# Patient Record
Sex: MALE | Race: WHITE | NOT HISPANIC OR LATINO | Employment: UNEMPLOYED | URBAN - METROPOLITAN AREA
[De-identification: names, ages, dates, MRNs, and addresses within clinical notes are randomized per-mention and may not be internally consistent; named-entity substitution may affect disease eponyms.]

---
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
## 2023-01-03 RX ORDER — HYDROXYZINE HYDROCHLORIDE 25 MG/1
25 TABLET, FILM COATED ORAL NIGHTLY PRN
Qty: 30 TABLET | Refills: 3 | Status: SHIPPED | OUTPATIENT
Start: 2023-01-03 | End: 2023-04-29 | Stop reason: SDUPTHER

## 2023-02-01 ENCOUNTER — OFFICE VISIT (OUTPATIENT)
Dept: PEDIATRICS | Facility: CLINIC | Age: 18
End: 2023-02-01
Payer: MEDICAID

## 2023-02-01 ENCOUNTER — LAB VISIT (OUTPATIENT)
Dept: LAB | Facility: HOSPITAL | Age: 18
End: 2023-02-01
Attending: PEDIATRICS
Payer: MEDICAID

## 2023-02-01 VITALS
HEART RATE: 72 BPM | TEMPERATURE: 98 F | WEIGHT: 315 LBS | SYSTOLIC BLOOD PRESSURE: 113 MMHG | BODY MASS INDEX: 45.1 KG/M2 | DIASTOLIC BLOOD PRESSURE: 73 MMHG | HEIGHT: 70 IN | RESPIRATION RATE: 20 BRPM

## 2023-02-01 DIAGNOSIS — Z00.00 ANNUAL PHYSICAL EXAM: ICD-10-CM

## 2023-02-01 DIAGNOSIS — E66.01 MORBID OBESITY: ICD-10-CM

## 2023-02-01 DIAGNOSIS — Z00.00 ANNUAL PHYSICAL EXAM: Primary | ICD-10-CM

## 2023-02-01 LAB
ALBUMIN SERPL BCP-MCNC: 3.8 G/DL (ref 3.5–5.2)
ALP SERPL-CCNC: 70 U/L (ref 55–135)
ALT SERPL W/O P-5'-P-CCNC: 36 U/L (ref 10–44)
ANION GAP SERPL CALC-SCNC: 11 MMOL/L (ref 8–16)
AST SERPL-CCNC: 17 U/L (ref 10–40)
BASOPHILS # BLD AUTO: 0.06 K/UL (ref 0–0.2)
BASOPHILS NFR BLD: 0.5 % (ref 0–1.9)
BILIRUB SERPL-MCNC: 0.4 MG/DL (ref 0.1–1)
BUN SERPL-MCNC: 9 MG/DL (ref 6–20)
CALCIUM SERPL-MCNC: 9.8 MG/DL (ref 8.7–10.5)
CHLORIDE SERPL-SCNC: 106 MMOL/L (ref 95–110)
CHOLEST SERPL-MCNC: 173 MG/DL (ref 120–199)
CHOLEST/HDLC SERPL: 5.4 {RATIO} (ref 2–5)
CO2 SERPL-SCNC: 26 MMOL/L (ref 23–29)
CREAT SERPL-MCNC: 0.8 MG/DL (ref 0.5–1.4)
DIFFERENTIAL METHOD: ABNORMAL
EOSINOPHIL # BLD AUTO: 0.2 K/UL (ref 0–0.5)
EOSINOPHIL NFR BLD: 1.8 % (ref 0–8)
ERYTHROCYTE [DISTWIDTH] IN BLOOD BY AUTOMATED COUNT: 12.7 % (ref 11.5–14.5)
EST. GFR  (NO RACE VARIABLE): >60 ML/MIN/1.73 M^2
ESTIMATED AVG GLUCOSE: 105 MG/DL (ref 68–131)
GLUCOSE SERPL-MCNC: 81 MG/DL (ref 70–110)
HBA1C MFR BLD: 5.3 % (ref 4–5.6)
HCT VFR BLD AUTO: 44.5 % (ref 40–54)
HDLC SERPL-MCNC: 32 MG/DL (ref 40–75)
HDLC SERPL: 18.5 % (ref 20–50)
HGB BLD-MCNC: 14.1 G/DL (ref 14–18)
IMM GRANULOCYTES # BLD AUTO: 0.04 K/UL (ref 0–0.04)
IMM GRANULOCYTES NFR BLD AUTO: 0.4 % (ref 0–0.5)
LDLC SERPL CALC-MCNC: 127.6 MG/DL (ref 63–159)
LYMPHOCYTES # BLD AUTO: 2.6 K/UL (ref 1–4.8)
LYMPHOCYTES NFR BLD: 23.4 % (ref 18–48)
MCH RBC QN AUTO: 26.3 PG (ref 27–31)
MCHC RBC AUTO-ENTMCNC: 31.7 G/DL (ref 32–36)
MCV RBC AUTO: 83 FL (ref 82–98)
MONOCYTES # BLD AUTO: 0.9 K/UL (ref 0.3–1)
MONOCYTES NFR BLD: 8.1 % (ref 4–15)
NEUTROPHILS # BLD AUTO: 7.2 K/UL (ref 1.8–7.7)
NEUTROPHILS NFR BLD: 65.8 % (ref 38–73)
NONHDLC SERPL-MCNC: 141 MG/DL
NRBC BLD-RTO: 0 /100 WBC
PLATELET # BLD AUTO: 328 K/UL (ref 150–450)
PMV BLD AUTO: 9.3 FL (ref 9.2–12.9)
POTASSIUM SERPL-SCNC: 3.9 MMOL/L (ref 3.5–5.1)
PROT SERPL-MCNC: 7.6 G/DL (ref 6–8.4)
RBC # BLD AUTO: 5.36 M/UL (ref 4.6–6.2)
SODIUM SERPL-SCNC: 143 MMOL/L (ref 136–145)
T4 FREE SERPL-MCNC: 1.04 NG/DL (ref 0.71–1.51)
TRIGL SERPL-MCNC: 67 MG/DL (ref 30–150)
TSH SERPL DL<=0.005 MIU/L-ACNC: 1.63 UIU/ML (ref 0.4–4)
WBC # BLD AUTO: 10.94 K/UL (ref 3.9–12.7)

## 2023-02-01 PROCEDURE — 1159F MED LIST DOCD IN RCRD: CPT | Mod: CPTII,,, | Performed by: PEDIATRICS

## 2023-02-01 PROCEDURE — 80061 LIPID PANEL: CPT | Performed by: PEDIATRICS

## 2023-02-01 PROCEDURE — 84443 ASSAY THYROID STIM HORMONE: CPT | Performed by: PEDIATRICS

## 2023-02-01 PROCEDURE — 85025 COMPLETE CBC W/AUTO DIFF WBC: CPT | Mod: PO | Performed by: PEDIATRICS

## 2023-02-01 PROCEDURE — 86762 RUBELLA ANTIBODY: CPT | Performed by: PEDIATRICS

## 2023-02-01 PROCEDURE — 86480 TB TEST CELL IMMUN MEASURE: CPT | Performed by: PEDIATRICS

## 2023-02-01 PROCEDURE — 84439 ASSAY OF FREE THYROXINE: CPT | Performed by: PEDIATRICS

## 2023-02-01 PROCEDURE — 1159F PR MEDICATION LIST DOCUMENTED IN MEDICAL RECORD: ICD-10-PCS | Mod: CPTII,,, | Performed by: PEDIATRICS

## 2023-02-01 PROCEDURE — 3008F PR BODY MASS INDEX (BMI) DOCUMENTED: ICD-10-PCS | Mod: CPTII,,, | Performed by: PEDIATRICS

## 2023-02-01 PROCEDURE — 1160F PR REVIEW ALL MEDS BY PRESCRIBER/CLIN PHARMACIST DOCUMENTED: ICD-10-PCS | Mod: CPTII,,, | Performed by: PEDIATRICS

## 2023-02-01 PROCEDURE — 86765 RUBEOLA ANTIBODY: CPT | Performed by: PEDIATRICS

## 2023-02-01 PROCEDURE — 36415 COLL VENOUS BLD VENIPUNCTURE: CPT | Mod: PO | Performed by: PEDIATRICS

## 2023-02-01 PROCEDURE — 99395 PR PREVENTIVE VISIT,EST,18-39: ICD-10-PCS | Mod: S$PBB,,, | Performed by: PEDIATRICS

## 2023-02-01 PROCEDURE — 3074F PR MOST RECENT SYSTOLIC BLOOD PRESSURE < 130 MM HG: ICD-10-PCS | Mod: CPTII,,, | Performed by: PEDIATRICS

## 2023-02-01 PROCEDURE — 83036 HEMOGLOBIN GLYCOSYLATED A1C: CPT | Performed by: PEDIATRICS

## 2023-02-01 PROCEDURE — 3008F BODY MASS INDEX DOCD: CPT | Mod: CPTII,,, | Performed by: PEDIATRICS

## 2023-02-01 PROCEDURE — 86787 VARICELLA-ZOSTER ANTIBODY: CPT | Performed by: PEDIATRICS

## 2023-02-01 PROCEDURE — 80053 COMPREHEN METABOLIC PANEL: CPT | Performed by: PEDIATRICS

## 2023-02-01 PROCEDURE — 3044F PR MOST RECENT HEMOGLOBIN A1C LEVEL <7.0%: ICD-10-PCS | Mod: CPTII,,, | Performed by: PEDIATRICS

## 2023-02-01 PROCEDURE — 99999 PR PBB SHADOW E&M-EST. PATIENT-LVL III: ICD-10-PCS | Mod: PBBFAC,,, | Performed by: PEDIATRICS

## 2023-02-01 PROCEDURE — 3078F PR MOST RECENT DIASTOLIC BLOOD PRESSURE < 80 MM HG: ICD-10-PCS | Mod: CPTII,,, | Performed by: PEDIATRICS

## 2023-02-01 PROCEDURE — 99213 OFFICE O/P EST LOW 20 MIN: CPT | Mod: PBBFAC,PN | Performed by: PEDIATRICS

## 2023-02-01 PROCEDURE — 1160F RVW MEDS BY RX/DR IN RCRD: CPT | Mod: CPTII,,, | Performed by: PEDIATRICS

## 2023-02-01 PROCEDURE — 99395 PREV VISIT EST AGE 18-39: CPT | Mod: S$PBB,,, | Performed by: PEDIATRICS

## 2023-02-01 PROCEDURE — 3044F HG A1C LEVEL LT 7.0%: CPT | Mod: CPTII,,, | Performed by: PEDIATRICS

## 2023-02-01 PROCEDURE — 3078F DIAST BP <80 MM HG: CPT | Mod: CPTII,,, | Performed by: PEDIATRICS

## 2023-02-01 PROCEDURE — 99999 PR PBB SHADOW E&M-EST. PATIENT-LVL III: CPT | Mod: PBBFAC,,, | Performed by: PEDIATRICS

## 2023-02-01 PROCEDURE — 3074F SYST BP LT 130 MM HG: CPT | Mod: CPTII,,, | Performed by: PEDIATRICS

## 2023-02-01 PROCEDURE — 86735 MUMPS ANTIBODY: CPT | Performed by: PEDIATRICS

## 2023-02-02 ENCOUNTER — PATIENT MESSAGE (OUTPATIENT)
Dept: PEDIATRICS | Facility: CLINIC | Age: 18
End: 2023-02-02
Payer: MEDICAID

## 2023-02-02 LAB
GAMMA INTERFERON BACKGROUND BLD IA-ACNC: 0.02 IU/ML
M TB IFN-G CD4+ BCKGRND COR BLD-ACNC: 0.01 IU/ML
MITOGEN IGNF BCKGRD COR BLD-ACNC: 3.18 IU/ML
MUMPS IGG INTERPRETATION: POSITIVE
MUMPS IGG SCREEN: >300 AU/ML
RUBEOLA IGG ANTIBODY: 200 AU/ML
RUBEOLA INTERPRETATION: POSITIVE
RUBV IGG SER-ACNC: 30.3 IU/ML
RUBV IGG SER-IMP: REACTIVE
TB GOLD PLUS: NEGATIVE
TB2 - NIL: 0 IU/ML
VARICELLA INTERPRETATION: POSITIVE
VARICELLA ZOSTER IGG: 1264 AU/ML

## 2023-02-12 NOTE — PROGRESS NOTES
Here for well check with parent    ALLERGY:reviewed  MEDICATIONS:reviewed  IMMUNIZATIONS:reviewed no adverse reaction  PMH: reviewed  FH:reviewed  SH:lives with family    no tobacco, drugs, alcohol    not sexually active    wears seat belt  DIET:good appetite, all foods, some junk foods  ROS   GEN:sleeps OK, no fever or weight loss   SKIN:no bruising or swelling   HEENT:hears and sees well, no eye, ear pain or neck injury, pain or masses   CHEST:normal breathing, no chest pain   CV:no cyanosis, dizziness, palpitations   ABD:nl BMs; no vomiting,no diarrhea,no pain    :nl urination, no dysuria, blood or frequency   GYN:no genital problems   MS:nl movements and gait, no swelling or pain   NEURO:no headache, weakness, incoordination, concussion signs or symptoms or spells   PSYCH:no behavior problem, depression, anxiety  PHYSICAL: see vitals reviewed   growth chart reviewed    GEN: alert, active, cooperative.Pain 0/10    SKIN:no rash, pallor, bruising or edema   HEAD:NCAT   EYE:EOMI, PERRLA, clear conjunctiva   EAR:clear canals, nl pinnae and TMs   NOSE:patent, no d/c, midline septum   MOUTH:nl teeth and gums, clear pharynx   NECK:nl ROM, no mass or thyromegaly   CHEST:nl chest wall, resp effort, clear BBS   CV:RRR, no murmur, nl S1S2   ABD:nl BS, ND, soft, NT; no HSM, mass    :nl anatomy, no mass or hernia    MS:nl ROM, no deformity or instability, nl gait, no scoliosis, no CCE   NEURO:nl tone and strength  IMP: Annual exam   PLAN:normal growth  Normal development  IUTD  GUIDANCE:teen issues and safety discussed in detail  Discussed good diet and exercise and tips for maintaining proper body weight for height  Interpretive conference conducted   Follow up annually & prn

## 2023-02-22 ENCOUNTER — OFFICE VISIT (OUTPATIENT)
Dept: OTOLARYNGOLOGY | Facility: CLINIC | Age: 18
End: 2023-02-22
Payer: MEDICAID

## 2023-02-22 ENCOUNTER — LAB VISIT (OUTPATIENT)
Dept: LAB | Facility: HOSPITAL | Age: 18
End: 2023-02-22
Attending: OTOLARYNGOLOGY
Payer: MEDICAID

## 2023-02-22 VITALS — BODY MASS INDEX: 49.09 KG/M2 | WEIGHT: 315 LBS

## 2023-02-22 DIAGNOSIS — J30.89 NON-SEASONAL ALLERGIC RHINITIS, UNSPECIFIED TRIGGER: ICD-10-CM

## 2023-02-22 DIAGNOSIS — J34.2 NASAL SEPTAL DEVIATION: ICD-10-CM

## 2023-02-22 DIAGNOSIS — J34.3 NASAL TURBINATE HYPERTROPHY: ICD-10-CM

## 2023-02-22 DIAGNOSIS — J32.9 CHRONIC SINUSITIS, UNSPECIFIED LOCATION: Primary | ICD-10-CM

## 2023-02-22 DIAGNOSIS — R09.81 CHRONIC NASAL CONGESTION: ICD-10-CM

## 2023-02-22 PROCEDURE — 3008F BODY MASS INDEX DOCD: CPT | Mod: CPTII,,, | Performed by: OTOLARYNGOLOGY

## 2023-02-22 PROCEDURE — 99999 PR PBB SHADOW E&M-EST. PATIENT-LVL II: ICD-10-PCS | Mod: PBBFAC,,, | Performed by: OTOLARYNGOLOGY

## 2023-02-22 PROCEDURE — 3044F HG A1C LEVEL LT 7.0%: CPT | Mod: CPTII,,, | Performed by: OTOLARYNGOLOGY

## 2023-02-22 PROCEDURE — 36415 COLL VENOUS BLD VENIPUNCTURE: CPT | Mod: PO | Performed by: OTOLARYNGOLOGY

## 2023-02-22 PROCEDURE — 99212 OFFICE O/P EST SF 10 MIN: CPT | Mod: PBBFAC,PO,25 | Performed by: OTOLARYNGOLOGY

## 2023-02-22 PROCEDURE — 86003 ALLG SPEC IGE CRUDE XTRC EA: CPT | Mod: 59 | Performed by: OTOLARYNGOLOGY

## 2023-02-22 PROCEDURE — 31231 NASAL ENDOSCOPY DX: CPT | Mod: S$PBB,,, | Performed by: OTOLARYNGOLOGY

## 2023-02-22 PROCEDURE — 99999 PR PBB SHADOW E&M-EST. PATIENT-LVL II: CPT | Mod: PBBFAC,,, | Performed by: OTOLARYNGOLOGY

## 2023-02-22 PROCEDURE — 1159F PR MEDICATION LIST DOCUMENTED IN MEDICAL RECORD: ICD-10-PCS | Mod: CPTII,,, | Performed by: OTOLARYNGOLOGY

## 2023-02-22 PROCEDURE — 86003 ALLG SPEC IGE CRUDE XTRC EA: CPT | Performed by: OTOLARYNGOLOGY

## 2023-02-22 PROCEDURE — 99214 PR OFFICE/OUTPT VISIT, EST, LEVL IV, 30-39 MIN: ICD-10-PCS | Mod: S$PBB,25,, | Performed by: OTOLARYNGOLOGY

## 2023-02-22 PROCEDURE — 3008F PR BODY MASS INDEX (BMI) DOCUMENTED: ICD-10-PCS | Mod: CPTII,,, | Performed by: OTOLARYNGOLOGY

## 2023-02-22 PROCEDURE — 31231 NASAL ENDOSCOPY DX: CPT | Mod: PBBFAC,PO | Performed by: OTOLARYNGOLOGY

## 2023-02-22 PROCEDURE — 99214 OFFICE O/P EST MOD 30 MIN: CPT | Mod: S$PBB,25,, | Performed by: OTOLARYNGOLOGY

## 2023-02-22 PROCEDURE — 31231 PR NASAL ENDOSCOPY, DX: ICD-10-PCS | Mod: S$PBB,,, | Performed by: OTOLARYNGOLOGY

## 2023-02-22 PROCEDURE — 3044F PR MOST RECENT HEMOGLOBIN A1C LEVEL <7.0%: ICD-10-PCS | Mod: CPTII,,, | Performed by: OTOLARYNGOLOGY

## 2023-02-22 PROCEDURE — 1159F MED LIST DOCD IN RCRD: CPT | Mod: CPTII,,, | Performed by: OTOLARYNGOLOGY

## 2023-02-22 NOTE — PROGRESS NOTES
Pediatric Otolaryngology- Head & Neck Surgery   Established Patient Visit      Chief Complaint: Follow up chronic nasal obstruction/post nasal drip    HPI  Nico Mcpherson is a 17 y.o. old male here for follow up  for chronic nasal obstruction and post nasal drip, which has been present for approximately years. Seen by me almost a year ago month ago. Was started on astelin and rinses. Helpful for some time but now with chronic symptoms over last 6 months.   The patient complains of  nasal obstruction, mucoid nasal discharge, face pain, nost nasal drip, eye pain. Dental pain, and bad breath and strange smells..  he has   been on medications for the nasal symptoms including flonase and astelin and rinses.      he has   history of allergies, has not had previous allergy testing.  Allergies do   run in the family.      The patient has has an adenoidectomy in the past.         Medical History  Past Medical History:   Diagnosis Date    BMI (body mass index), pediatric, greater than or equal to 95% for age 8/12/2014       Surgical History  Past Surgical History:   Procedure Laterality Date    ADENOIDECTOMY      REMOVAL OF NAIL OF DIGIT Left 9/20/2018    Procedure: REMOVAL, NAIL, DIGIT;  Surgeon: Blayne العراقي DPM;  Location: [facility];  Service: Podiatry;  Laterality: Left;    toenail surgery Bilateral     removal x2    TONSILLECTOMY         Medications  Current Outpatient Medications on File Prior to Visit   Medication Sig Dispense Refill    azelastine (ASTELIN) 137 mcg (0.1 %) nasal spray Spray 2 sprays (274 mcg total) by Nasal route 2 (two) times daily. 30 mL 3    fexofenadine (ALLEGRA) 180 MG tablet Take 1 tablet (180 mg total) by mouth once daily. (Patient not taking: Reported on 2/1/2023) 30 tablet 2    fluticasone propionate (FLONASE ALLERGY RELIEF) 50 mcg/actuation nasal spray 2 sprays (100 mcg total) by Each Nostril route once daily. (Patient not taking: Reported on 4/6/2022) 16 g 3    hydrOXYzine HCL (ATARAX) 25 MG  tablet Take 1 tablet (25 mg total) by mouth nightly as needed. (Patient not taking: Reported on 2/1/2023) 30 tablet 3    naproxen (NAPROSYN) 500 MG tablet Take 1 tablet (500 mg total) by mouth 2 (two) times daily as needed (headache). (Patient not taking: Reported on 2/1/2023) 20 tablet 0    omega-3 fatty acids/fish oil (FISH OIL-OMEGA-3 FATTY ACIDS) 300-1,000 mg capsule Take by mouth once daily.       No current facility-administered medications on file prior to visit.       Allergies  Review of patient's allergies indicates:   Allergen Reactions    Whey Swelling     Tongue swelling; denies trouble breathing       Social History  There are no smokers in the home    Family History  There is no family history of bleeding disorders or problems with anesthesia.           Physical Exam  General:  Alert, well developed, comfortable, mouth breathing  Voice:  Regular for age, good volume  Respiratory:  Symmetric breathing, no stridor, no distress  Head:  Normocephalic, no lesions  Face: Symmetric, HB 1/6 bilat, no lesions, no obvious sinus tenderness, salivary glands nontender  Eyes:  Sclera white, extraocular movements intact  Nose: Dorsum straight, septum w left deviation,nomral turbinate size,red mucosa  Right Ear: Pinna and external ear appears normal, EAC patent, TM intact, mobile, without middle ear effusion  Left Ear: Pinna and external ear appears normal, EAC patent, TM intact, mobile, without middle ear effusion  Hearing:  Grossly intact  Oral cavity: Healthy mucosa, no masses or lesions including lips, teeth, gums, floor of mouth, palate, or tongue.  Oropharynx: Tonsils absent, palate intact, normal pharyngeal wall movement  Neck: Supple, no palpable nodes, no masses, trachea midline, no thyroid masses  Cardiovascular system:  Pulses regular in both upper extremities, good skin turgor   Neuro: CN II-XII intact, moves all extremities spontaneously  Skin: no rash    Procedure:  Flexible fiberoptic  nasopharyngoscopy  Surgeon:  Rodger Chin MD     Detail:  After confirming patient and verbal consent, the nose was anesthetized with topical lidocaine and afrin.  The flexible fiberoptic endoscope was passed through the left nostril revealing enlarged and inflamed turbinates. There was mucoid discharge in the nasal cavity. The sope was then advanced to the nasopharynx revealing nonobstructive adenoid tissue.  The flexible fiberoptic endoscope was passed through the right nostril revealing revealing enlarged and inflamed turbinates. There was mucoid discharge in the nasal cavity.  The scope was then removed and the patient tolerated the procedure well.        Impression  1. Chronic sinusitis, unspecified location        2. Non-seasonal allergic rhinitis, unspecified trigger  CT Medtronic Sinuses without    Allergen, Pecan Tree IgE    Allergen-Alternaria Alternata    Aspergillus fumagatus IgE    Bahia grass IgE    Bermuda grass IgE    Cat epithelium IgE    Cladosporium IgE    Cockroach, American IgE    Curvularia lunata IgE    D. farinae IgE    Ceasar IgE    Ragweed, short, common IgE    Plantain, English IgE    Oak, white IgE    Marsh elder, rough IgE    Scottie grass IgE    Dust, house, David IgE    Dust, house, Valles IgE    Dog dander IgE    D. pteronyssinus IgE      3. Nasal turbinate hypertrophy        4. Chronic nasal congestion        5. Nasal septal deviation            Suspect chronic sinusitis. Worst symptoms are chronic post nasal drip and facial pressure and pain  Chronic nasal obstruction secondary to likely allergic rhinitis, turb hypertrophy and left septal deviation, symptoms greatly improved with astelin    Treatment Plan   - cont rinses  - cont astelin and oral antihistamine  - sinus ct  - poss FESS, septo/smrt. If scan normal then AI referral  Allergy testing today  Rodger Chin MD  Pediatric Otolaryngology Attending

## 2023-02-27 LAB
A ALTERNATA IGE QN: <0.1 KU/L
A FUMIGATUS IGE QN: <0.1 KU/L
BAHIA GRASS IGE QN: <0.1 KU/L
BERMUDA GRASS IGE QN: <0.1 KU/L
C HERBARUM IGE QN: <0.1 KU/L
C LUNATA IGE QN: <0.1 KU/L
CAT DANDER IGE QN: <0.1 KU/L
COMMON RAGWEED IGE QN: <0.1 KU/L
D FARINAE IGE QN: 0.13 KU/L
D PTERONYSS IGE QN: 0.16 KU/L
DEPRECATED A ALTERNATA IGE RAST QL: NORMAL
DEPRECATED A FUMIGATUS IGE RAST QL: NORMAL
DEPRECATED BAHIA GRASS IGE RAST QL: NORMAL
DEPRECATED BERMUDA GRASS IGE RAST QL: NORMAL
DEPRECATED C HERBARUM IGE RAST QL: NORMAL
DEPRECATED C LUNATA IGE RAST QL: NORMAL
DEPRECATED CAT DANDER IGE RAST QL: NORMAL
DEPRECATED COMMON RAGWEED IGE RAST QL: NORMAL
DEPRECATED D FARINAE IGE RAST QL: ABNORMAL
DEPRECATED D PTERONYSS IGE RAST QL: ABNORMAL
DEPRECATED DOG DANDER IGE RAST QL: NORMAL
DEPRECATED ELDER IGE RAST QL: NORMAL
DEPRECATED ENGL PLANTAIN IGE RAST QL: NORMAL
DEPRECATED HOUSE DUST GREER IGE RAST QL: NORMAL
DEPRECATED HOUSE DUST HS IGE RAST QL: NORMAL
DEPRECATED JOHNSON GRASS IGE RAST QL: NORMAL
DEPRECATED PECAN/HICK TREE IGE RAST QL: NORMAL
DEPRECATED ROACH IGE RAST QL: ABNORMAL
DEPRECATED TIMOTHY IGE RAST QL: NORMAL
DEPRECATED WHITE OAK IGE RAST QL: NORMAL
DOG DANDER IGE QN: <0.1 KU/L
ELDER IGE QN: <0.1 KU/L
ENGL PLANTAIN IGE QN: <0.1 KU/L
HOUSE DUST GREER IGE QN: <0.1 KU/L
HOUSE DUST HS IGE QN: <0.1 KU/L
JOHNSON GRASS IGE QN: <0.1 KU/L
PECAN/HICK TREE IGE QN: <0.1 KU/L
ROACH IGE QN: 0.11 KU/L
TIMOTHY IGE QN: <0.1 KU/L
WHITE OAK IGE QN: <0.1 KU/L

## 2023-02-28 ENCOUNTER — TELEPHONE (OUTPATIENT)
Dept: OTOLARYNGOLOGY | Facility: CLINIC | Age: 18
End: 2023-02-28
Payer: MEDICAID

## 2023-02-28 NOTE — TELEPHONE ENCOUNTER
----- Message from Rodger Chin MD sent at 2/28/2023  7:19 AM CST -----  Can u let patient know mild dust mite and cockroach allergy

## 2023-02-28 NOTE — TELEPHONE ENCOUNTER
Spoke with dad and informed him per Dr. Chin that his lab results showed a mild dust mite and cockroach allergy. Dad expressed understanding.

## 2023-03-02 RX ORDER — AZELASTINE 1 MG/ML
2 SPRAY, METERED NASAL 2 TIMES DAILY
Qty: 30 ML | Refills: 3 | Status: ON HOLD | OUTPATIENT
Start: 2023-03-02 | End: 2023-04-18 | Stop reason: HOSPADM

## 2023-03-10 ENCOUNTER — PATIENT MESSAGE (OUTPATIENT)
Dept: OTOLARYNGOLOGY | Facility: CLINIC | Age: 18
End: 2023-03-10
Payer: MEDICAID

## 2023-03-14 ENCOUNTER — TELEPHONE (OUTPATIENT)
Dept: OTOLARYNGOLOGY | Facility: CLINIC | Age: 18
End: 2023-03-14
Payer: MEDICAID

## 2023-03-14 DIAGNOSIS — R09.81 CHRONIC NASAL CONGESTION: ICD-10-CM

## 2023-03-14 DIAGNOSIS — J32.9 CHRONIC RECURRENT SINUSITIS: ICD-10-CM

## 2023-03-14 DIAGNOSIS — J30.89 NON-SEASONAL ALLERGIC RHINITIS, UNSPECIFIED TRIGGER: Primary | ICD-10-CM

## 2023-03-14 DIAGNOSIS — J34.3 NASAL TURBINATE HYPERTROPHY: ICD-10-CM

## 2023-03-14 DIAGNOSIS — J34.2 NASAL SEPTAL DEVIATION: ICD-10-CM

## 2023-03-14 DIAGNOSIS — J32.9 CHRONIC SINUSITIS, UNSPECIFIED LOCATION: ICD-10-CM

## 2023-04-17 ENCOUNTER — TELEPHONE (OUTPATIENT)
Dept: OTOLARYNGOLOGY | Facility: CLINIC | Age: 18
End: 2023-04-17
Payer: MEDICAID

## 2023-04-17 ENCOUNTER — PATIENT MESSAGE (OUTPATIENT)
Dept: OTOLARYNGOLOGY | Facility: CLINIC | Age: 18
End: 2023-04-17
Payer: MEDICAID

## 2023-04-18 ENCOUNTER — HOSPITAL ENCOUNTER (OUTPATIENT)
Facility: HOSPITAL | Age: 18
Discharge: HOME | End: 2023-04-18
Attending: OTOLARYNGOLOGY | Admitting: OTOLARYNGOLOGY
Payer: MEDICAID

## 2023-04-18 ENCOUNTER — ANESTHESIA EVENT (OUTPATIENT)
Dept: SURGERY | Facility: HOSPITAL | Age: 18
End: 2023-04-18
Payer: MEDICAID

## 2023-04-18 ENCOUNTER — ANESTHESIA (OUTPATIENT)
Dept: SURGERY | Facility: HOSPITAL | Age: 18
End: 2023-04-18
Payer: MEDICAID

## 2023-04-18 VITALS
WEIGHT: 315 LBS | DIASTOLIC BLOOD PRESSURE: 54 MMHG | SYSTOLIC BLOOD PRESSURE: 120 MMHG | BODY MASS INDEX: 45.1 KG/M2 | HEIGHT: 70 IN | TEMPERATURE: 98 F | OXYGEN SATURATION: 94 % | HEART RATE: 91 BPM | RESPIRATION RATE: 20 BRPM

## 2023-04-18 DIAGNOSIS — J34.2 NASAL SEPTAL DEVIATION: ICD-10-CM

## 2023-04-18 PROCEDURE — 37000009 HC ANESTHESIA EA ADD 15 MINS: Performed by: OTOLARYNGOLOGY

## 2023-04-18 PROCEDURE — 27201423 OPTIME MED/SURG SUP & DEVICES STERILE SUPPLY: Performed by: OTOLARYNGOLOGY

## 2023-04-18 PROCEDURE — 31267 PR NASAL/SINUS ENDOSCOPY,RMV TISS MAXILL SINUS: ICD-10-PCS | Mod: 50,51,, | Performed by: OTOLARYNGOLOGY

## 2023-04-18 PROCEDURE — 30802 PR RF ABLATION INF TURBINATE SUBMUCOSAL: ICD-10-PCS | Mod: 51,,, | Performed by: OTOLARYNGOLOGY

## 2023-04-18 PROCEDURE — 30802 ABLATE INF TURBINATE SUBMUC: CPT | Mod: 51,,, | Performed by: OTOLARYNGOLOGY

## 2023-04-18 PROCEDURE — D9220A PRA ANESTHESIA: ICD-10-PCS | Mod: ANES,,, | Performed by: ANESTHESIOLOGY

## 2023-04-18 PROCEDURE — 63600175 PHARM REV CODE 636 W HCPCS: Performed by: NURSE ANESTHETIST, CERTIFIED REGISTERED

## 2023-04-18 PROCEDURE — 31255 NSL/SINS NDSC W/TOT ETHMDCT: CPT | Mod: 59,RT,, | Performed by: OTOLARYNGOLOGY

## 2023-04-18 PROCEDURE — D9220A PRA ANESTHESIA: Mod: CRNA,,, | Performed by: NURSE ANESTHETIST, CERTIFIED REGISTERED

## 2023-04-18 PROCEDURE — 30520 REPAIR OF NASAL SEPTUM: CPT | Mod: 51,,, | Performed by: OTOLARYNGOLOGY

## 2023-04-18 PROCEDURE — 31253 PR ENDOSCOPY, NASAL/SINUS, W/ETHMOIDECTOMY: ICD-10-PCS | Mod: LT,,, | Performed by: OTOLARYNGOLOGY

## 2023-04-18 PROCEDURE — 61782 PR STEREOTACTIC COMP ASSIST PROC,CRANIAL,EXTRADURAL: ICD-10-PCS | Mod: ,,, | Performed by: OTOLARYNGOLOGY

## 2023-04-18 PROCEDURE — D9220A PRA ANESTHESIA: Mod: ANES,,, | Performed by: ANESTHESIOLOGY

## 2023-04-18 PROCEDURE — D9220A PRA ANESTHESIA: ICD-10-PCS | Mod: CRNA,,, | Performed by: NURSE ANESTHETIST, CERTIFIED REGISTERED

## 2023-04-18 PROCEDURE — 25000003 PHARM REV CODE 250: Performed by: NURSE ANESTHETIST, CERTIFIED REGISTERED

## 2023-04-18 PROCEDURE — 61782 SCAN PROC CRANIAL EXTRA: CPT | Mod: ,,, | Performed by: OTOLARYNGOLOGY

## 2023-04-18 PROCEDURE — C1763 CONN TISS, NON-HUMAN: HCPCS | Performed by: OTOLARYNGOLOGY

## 2023-04-18 PROCEDURE — 31255 PR NASAL/SINUS ENDOSCOPY,REMV TOTL ETHMOID: ICD-10-PCS | Mod: 59,RT,, | Performed by: OTOLARYNGOLOGY

## 2023-04-18 PROCEDURE — 30520 PR REPAIR, NASAL SEPTUM: ICD-10-PCS | Mod: 51,,, | Performed by: OTOLARYNGOLOGY

## 2023-04-18 PROCEDURE — 25000003 PHARM REV CODE 250: Performed by: ANESTHESIOLOGY

## 2023-04-18 PROCEDURE — 71000044 HC DOSC ROUTINE RECOVERY FIRST HOUR: Performed by: OTOLARYNGOLOGY

## 2023-04-18 PROCEDURE — 36000710: Performed by: OTOLARYNGOLOGY

## 2023-04-18 PROCEDURE — 36000711: Performed by: OTOLARYNGOLOGY

## 2023-04-18 PROCEDURE — 63600175 PHARM REV CODE 636 W HCPCS: Performed by: OTOLARYNGOLOGY

## 2023-04-18 PROCEDURE — 71000015 HC POSTOP RECOV 1ST HR: Performed by: OTOLARYNGOLOGY

## 2023-04-18 PROCEDURE — 31253 NSL/SINS NDSC TOTAL: CPT | Mod: LT,,, | Performed by: OTOLARYNGOLOGY

## 2023-04-18 PROCEDURE — 31267 ENDOSCOPY MAXILLARY SINUS: CPT | Mod: 50,51,, | Performed by: OTOLARYNGOLOGY

## 2023-04-18 PROCEDURE — 25000003 PHARM REV CODE 250: Performed by: STUDENT IN AN ORGANIZED HEALTH CARE EDUCATION/TRAINING PROGRAM

## 2023-04-18 PROCEDURE — 37000008 HC ANESTHESIA 1ST 15 MINUTES: Performed by: OTOLARYNGOLOGY

## 2023-04-18 PROCEDURE — 25000003 PHARM REV CODE 250: Performed by: OTOLARYNGOLOGY

## 2023-04-18 RX ORDER — HYDROCODONE BITARTRATE AND ACETAMINOPHEN 5; 325 MG/1; MG/1
1 TABLET ORAL EVERY 4 HOURS PRN
Status: DISCONTINUED | OUTPATIENT
Start: 2023-04-18 | End: 2023-04-18 | Stop reason: HOSPADM

## 2023-04-18 RX ORDER — LIDOCAINE HYDROCHLORIDE AND EPINEPHRINE 10; 10 MG/ML; UG/ML
INJECTION, SOLUTION INFILTRATION; PERINEURAL
Status: DISCONTINUED
Start: 2023-04-18 | End: 2023-04-18 | Stop reason: HOSPADM

## 2023-04-18 RX ORDER — MUPIROCIN 20 MG/G
OINTMENT TOPICAL
Status: DISCONTINUED
Start: 2023-04-18 | End: 2023-04-18 | Stop reason: WASHOUT

## 2023-04-18 RX ORDER — ROCURONIUM BROMIDE 10 MG/ML
INJECTION, SOLUTION INTRAVENOUS
Status: DISCONTINUED | OUTPATIENT
Start: 2023-04-18 | End: 2023-04-18

## 2023-04-18 RX ORDER — BACITRACIN ZINC 500 UNIT/G
OINTMENT (GRAM) TOPICAL
Status: DISCONTINUED | OUTPATIENT
Start: 2023-04-18 | End: 2023-04-18 | Stop reason: HOSPADM

## 2023-04-18 RX ORDER — OXYMETAZOLINE HCL 0.05 %
SPRAY, NON-AEROSOL (ML) NASAL
Status: DISCONTINUED
Start: 2023-04-18 | End: 2023-04-18 | Stop reason: HOSPADM

## 2023-04-18 RX ORDER — EPINEPHRINE 1 MG/ML
INJECTION, SOLUTION, CONCENTRATE INTRAVENOUS
Status: DISCONTINUED
Start: 2023-04-18 | End: 2023-04-18 | Stop reason: HOSPADM

## 2023-04-18 RX ORDER — DEXAMETHASONE SODIUM PHOSPHATE 4 MG/ML
INJECTION, SOLUTION INTRA-ARTICULAR; INTRALESIONAL; INTRAMUSCULAR; INTRAVENOUS; SOFT TISSUE
Status: DISCONTINUED | OUTPATIENT
Start: 2023-04-18 | End: 2023-04-18

## 2023-04-18 RX ORDER — ACETAMINOPHEN 10 MG/ML
INJECTION, SOLUTION INTRAVENOUS
Status: DISCONTINUED | OUTPATIENT
Start: 2023-04-18 | End: 2023-04-18

## 2023-04-18 RX ORDER — LIDOCAINE HYDROCHLORIDE 20 MG/ML
INJECTION INTRAVENOUS
Status: DISCONTINUED | OUTPATIENT
Start: 2023-04-18 | End: 2023-04-18

## 2023-04-18 RX ORDER — LIDOCAINE HYDROCHLORIDE 10 MG/ML
1 INJECTION, SOLUTION EPIDURAL; INFILTRATION; INTRACAUDAL; PERINEURAL ONCE
Status: DISCONTINUED | OUTPATIENT
Start: 2023-04-18 | End: 2023-04-18 | Stop reason: HOSPADM

## 2023-04-18 RX ORDER — PROPOFOL 10 MG/ML
VIAL (ML) INTRAVENOUS
Status: DISCONTINUED | OUTPATIENT
Start: 2023-04-18 | End: 2023-04-18

## 2023-04-18 RX ORDER — HYDROCODONE BITARTRATE AND ACETAMINOPHEN 5; 325 MG/1; MG/1
1 TABLET ORAL EVERY 6 HOURS PRN
Qty: 28 TABLET | Refills: 0 | Status: SHIPPED | OUTPATIENT
Start: 2023-04-18 | End: 2023-04-25

## 2023-04-18 RX ORDER — SODIUM CHLORIDE 9 MG/ML
INJECTION, SOLUTION INTRAVENOUS CONTINUOUS
Status: DISCONTINUED | OUTPATIENT
Start: 2023-04-18 | End: 2023-04-18 | Stop reason: HOSPADM

## 2023-04-18 RX ORDER — MIDAZOLAM HYDROCHLORIDE 1 MG/ML
INJECTION, SOLUTION INTRAMUSCULAR; INTRAVENOUS
Status: DISCONTINUED | OUTPATIENT
Start: 2023-04-18 | End: 2023-04-18

## 2023-04-18 RX ORDER — EPINEPHRINE 1 MG/ML
INJECTION, SOLUTION, CONCENTRATE INTRAVENOUS
Status: DISCONTINUED | OUTPATIENT
Start: 2023-04-18 | End: 2023-04-18 | Stop reason: HOSPADM

## 2023-04-18 RX ORDER — HYDROMORPHONE HYDROCHLORIDE 1 MG/ML
0.2 INJECTION, SOLUTION INTRAMUSCULAR; INTRAVENOUS; SUBCUTANEOUS EVERY 5 MIN PRN
Status: DISCONTINUED | OUTPATIENT
Start: 2023-04-18 | End: 2023-04-18 | Stop reason: HOSPADM

## 2023-04-18 RX ORDER — SODIUM CHLORIDE 0.9 % (FLUSH) 0.9 %
3 SYRINGE (ML) INJECTION
Status: DISCONTINUED | OUTPATIENT
Start: 2023-04-18 | End: 2023-04-18 | Stop reason: HOSPADM

## 2023-04-18 RX ORDER — ONDANSETRON 2 MG/ML
INJECTION INTRAMUSCULAR; INTRAVENOUS
Status: DISCONTINUED | OUTPATIENT
Start: 2023-04-18 | End: 2023-04-18

## 2023-04-18 RX ORDER — DEXMEDETOMIDINE HYDROCHLORIDE 100 UG/ML
INJECTION, SOLUTION INTRAVENOUS
Status: DISCONTINUED | OUTPATIENT
Start: 2023-04-18 | End: 2023-04-18

## 2023-04-18 RX ORDER — LIDOCAINE HYDROCHLORIDE AND EPINEPHRINE 10; 10 MG/ML; UG/ML
INJECTION, SOLUTION INFILTRATION; PERINEURAL
Status: DISCONTINUED | OUTPATIENT
Start: 2023-04-18 | End: 2023-04-18 | Stop reason: HOSPADM

## 2023-04-18 RX ORDER — CEFAZOLIN SODIUM 1 G/3ML
INJECTION, POWDER, FOR SOLUTION INTRAMUSCULAR; INTRAVENOUS
Status: DISCONTINUED | OUTPATIENT
Start: 2023-04-18 | End: 2023-04-18

## 2023-04-18 RX ORDER — FENTANYL CITRATE 50 UG/ML
INJECTION, SOLUTION INTRAMUSCULAR; INTRAVENOUS
Status: DISCONTINUED | OUTPATIENT
Start: 2023-04-18 | End: 2023-04-18

## 2023-04-18 RX ORDER — BACITRACIN 500 [USP'U]/G
OINTMENT TOPICAL
Status: DISCONTINUED
Start: 2023-04-18 | End: 2023-04-18 | Stop reason: HOSPADM

## 2023-04-18 RX ORDER — PROCHLORPERAZINE EDISYLATE 5 MG/ML
5 INJECTION INTRAMUSCULAR; INTRAVENOUS EVERY 30 MIN PRN
Status: DISCONTINUED | OUTPATIENT
Start: 2023-04-18 | End: 2023-04-18 | Stop reason: HOSPADM

## 2023-04-18 RX ADMIN — DEXMEDETOMIDINE HYDROCHLORIDE 8 MCG: 100 INJECTION, SOLUTION INTRAVENOUS at 04:04

## 2023-04-18 RX ADMIN — ROCURONIUM BROMIDE 60 MG: 10 INJECTION INTRAVENOUS at 01:04

## 2023-04-18 RX ADMIN — CEFAZOLIN 3 G: 330 INJECTION, POWDER, FOR SOLUTION INTRAMUSCULAR; INTRAVENOUS at 01:04

## 2023-04-18 RX ADMIN — DEXAMETHASONE SODIUM PHOSPHATE 8 MG: 4 INJECTION, SOLUTION INTRAMUSCULAR; INTRAVENOUS at 01:04

## 2023-04-18 RX ADMIN — MIDAZOLAM HYDROCHLORIDE 2 MG: 1 INJECTION, SOLUTION INTRAMUSCULAR; INTRAVENOUS at 12:04

## 2023-04-18 RX ADMIN — FENTANYL CITRATE 50 MCG: 50 INJECTION, SOLUTION INTRAMUSCULAR; INTRAVENOUS at 01:04

## 2023-04-18 RX ADMIN — PROPOFOL 150 MG: 10 INJECTION, EMULSION INTRAVENOUS at 01:04

## 2023-04-18 RX ADMIN — ONDANSETRON 4 MG: 2 INJECTION INTRAMUSCULAR; INTRAVENOUS at 03:04

## 2023-04-18 RX ADMIN — DEXMEDETOMIDINE HYDROCHLORIDE 12 MCG: 100 INJECTION, SOLUTION INTRAVENOUS at 04:04

## 2023-04-18 RX ADMIN — PROPOFOL 100 MG: 10 INJECTION, EMULSION INTRAVENOUS at 01:04

## 2023-04-18 RX ADMIN — LIDOCAINE HYDROCHLORIDE 100 MG: 20 INJECTION INTRAVENOUS at 01:04

## 2023-04-18 RX ADMIN — ACETAMINOPHEN 1000 MG: 10 INJECTION, SOLUTION INTRAVENOUS at 01:04

## 2023-04-18 RX ADMIN — SUGAMMADEX 300 MG: 100 INJECTION, SOLUTION INTRAVENOUS at 04:04

## 2023-04-18 RX ADMIN — FENTANYL CITRATE 50 MCG: 50 INJECTION, SOLUTION INTRAMUSCULAR; INTRAVENOUS at 02:04

## 2023-04-18 RX ADMIN — HYDROCODONE BITARTRATE AND ACETAMINOPHEN 1 TABLET: 5; 325 TABLET ORAL at 05:04

## 2023-04-18 RX ADMIN — SODIUM CHLORIDE: 0.9 INJECTION, SOLUTION INTRAVENOUS at 12:04

## 2023-04-18 NOTE — PATIENT INSTRUCTIONS
"Start your sinus rinses tomorrow. Refer to the generic instructions below. Recommend purchasing one of the [employer] bottles listed below.     Pediatric Sinus Rinse Kit Instructions:    Nasal rinses or irrigation may help your sinus symptoms by washing away mucus, allergy causing particles and irritants such as pollens, dust particles, pollutants and bacteria, which may help decrease inflammation of the sinus lining. This may help to fight sinus infections and reduce allergies symptoms.     First purchase a Rinse bottle +/- mixing ingredients:    Here are some examples from the company [employer] which you can purchase over the counter at most drug stores.         Encourage your child to "Brush their nose" themselves. I find that if you can get them to do the spray then it is much more effective.     Visit: http://www.[employer]oka.com/Use-a-Neilmed-Sinus-Rinse for more detailed and easy to follow instructions on how to use the kit. It has pictures!    IF you don't want to buy the pre-made packets you can also make a similar solution at home:      Assemble ingredients:   Salt: Kosher or pickling or seth salt   Water: Use boiled or purified water. Room temperature water or slightly warmer will make it more comfortable   Baking soda: Not powder     Avoid using if your child has any of the following:   Ear infection   Recent ear surgery   Completely block nasal or ear passage   Swallowing disorder     Cleaning of rinse container:   After each use, wash out with warm soapy water and rinse thoroughly, air dry on a paper towel. Weekly clean with a solution of 2 TBS of white distilled vinegar and one-cup of water.     Some Tips:   Breathe through your mouth or hold breath while rinsing   Stop rinsing if you need to sneeze or cough   Dont talk while rinsing   Rinse at least 1-2 hours before bedtime     Instructions for Rinsin. Wash hands   2. Fill up bottle with 8 oz. room temperature or slightly warmer water   3. Add 1 " teaspoon salt and ¼ teaspoon baking soda or add Sinus Rinse packet   4. Put cap on bottle and place finger over hole and shake until mixture is dissolved   5. Stand over the sink and bend forward, and tilt head forward   6. Put the cap tightly up to the nasal passage and squeeze bottle gently using ¼ of the solution   7. When finished, blow nose with both nostrils open and repeat to use half of the solution then blow nose again   8. Repeat the same process on the other nasal passage     A You-tube video of other children doing this can be seen at :    Child doing rinse: https://www.youOpenDoorube.com/watch?v=[email or web address]  Parent administering rinse: https://www.youtube.com/watch?v=aZgueuvJIsQ

## 2023-04-18 NOTE — TRANSFER OF CARE
"Anesthesia Transfer of Care Note    Patient: Nico Mcpherson    Procedure(s) Performed: Procedure(s) (LRB):  FESS, USING COMPUTER-ASSISTED NAVIGATION (N/A)  SEPTOPLASTY, NOSE (N/A)  REDUCTION, NASAL TURBINATE (Bilateral)    Patient location: [facility]    Anesthesia Type: general    Transport from OR: Transported from OR on 6-10 L/min O2 by face mask with adequate spontaneous ventilation    Post pain: adequate analgesia    Post assessment: no apparent anesthetic complications and tolerated procedure well    Post vital signs: stable    Level of consciousness: sedated and responds to stimulation    Nausea/Vomiting: no nausea/vomiting    Complications: none    Transfer of care protocol was followed      Last vitals:   Visit Vitals  /70   Pulse 86   Temp 37.2 °C (99 °F) (Temporal)   Resp 16   Ht 5' 10" (1.778 m)   Wt (!) 154.2 kg (340 lb)   SpO2 99%   BMI 48.78 kg/m²     "

## 2023-04-18 NOTE — H&P
Pediatric Otolaryngology- Head & Neck Surgery   Established Patient Visit      Chief Complaint: Follow up chronic nasal obstruction/post nasal drip    HPI  Nico Mcpherson is a 17 y.o. old male here for follow up  for chronic nasal obstruction and post nasal drip, which has been present for approximately years. Seen by me almost a year ago month ago. Was started on astelin and rinses. Helpful for some time but now with chronic symptoms over last 6 months.   The patient complains of  nasal obstruction, mucoid nasal discharge, face pain, nost nasal drip, eye pain. Dental pain, and bad breath and strange smells..  he has   been on medications for the nasal symptoms including flonase and astelin and rinses.      he has   history of allergies, has not had previous allergy testing.  Allergies do   run in the family.      The patient has has an adenoidectomy in the past.         Medical History  Past Medical History:   Diagnosis Date    BMI (body mass index), pediatric, greater than or equal to 95% for age 8/12/2014       Surgical History  Past Surgical History:   Procedure Laterality Date    ADENOIDECTOMY      REMOVAL OF NAIL OF DIGIT Left 9/20/2018    Procedure: REMOVAL, NAIL, DIGIT;  Surgeon: Blayne العراقي DPM;  Location: [facility];  Service: Podiatry;  Laterality: Left;    toenail surgery Bilateral     removal x2    TONSILLECTOMY         Medications  No current facility-administered medications on file prior to encounter.     Current Outpatient Medications on File Prior to Encounter   Medication Sig Dispense Refill    azelastine (ASTELIN) 137 mcg (0.1 %) nasal spray Spray 2 sprays (274 mcg total) into each nostril 2 (two) times daily. 30 mL 3    hydrOXYzine HCL (ATARAX) 25 MG tablet Take 1 tablet (25 mg total) by mouth nightly as needed. (Patient taking differently: Take 25 mg by mouth nightly.) 30 tablet 3    omega-3 fatty acids/fish oil (FISH OIL-OMEGA-3 FATTY ACIDS) 300-1,000 mg capsule Take by mouth once daily.  Rarely takes         Allergies  Review of patient's allergies indicates:   Allergen Reactions    Whey Swelling     Tongue swelling; denies trouble breathing       Social History  There are no smokers in the home    Family History  There is no family history of bleeding disorders or problems with anesthesia.           Physical Exam  General:  Alert, well developed, comfortable, mouth breathing  Voice:  Regular for age, good volume  Respiratory:  Symmetric breathing, no stridor, no distress  Head:  Normocephalic, no lesions  Face: Symmetric, HB 1/6 bilat, no lesions, no obvious sinus tenderness, salivary glands nontender  Eyes:  Sclera white, extraocular movements intact  Nose: Dorsum straight, septum w left deviation,nomral turbinate size,red mucosa  Right Ear: Pinna and external ear appears normal, EAC patent, TM intact, mobile, without middle ear effusion  Left Ear: Pinna and external ear appears normal, EAC patent, TM intact, mobile, without middle ear effusion  Hearing:  Grossly intact  Oral cavity: Healthy mucosa, no masses or lesions including lips, teeth, gums, floor of mouth, palate, or tongue.  Oropharynx: Tonsils absent, palate intact, normal pharyngeal wall movement  Neck: Supple, no palpable nodes, no masses, trachea midline, no thyroid masses  Cardiovascular system:  Pulses regular in both upper extremities, good skin turgor   Neuro: CN II-XII intact, moves all extremities spontaneously  Skin: no rash    Procedure:  Flexible fiberoptic nasopharyngoscopy  Surgeon:  Rodger Chin MD     Detail:  After confirming patient and verbal consent, the nose was anesthetized with topical lidocaine and afrin.  The flexible fiberoptic endoscope was passed through the left nostril revealing enlarged and inflamed turbinates. There was mucoid discharge in the nasal cavity. The sope was then advanced to the nasopharynx revealing nonobstructive adenoid tissue.  The flexible fiberoptic endoscope was passed through the right  nostril revealing revealing enlarged and inflamed turbinates. There was mucoid discharge in the nasal cavity.  The scope was then removed and the patient tolerated the procedure well.        Impression  1. Chronic sinusitis, unspecified location        2. Non-seasonal allergic rhinitis, unspecified trigger  CT Med[employer] Sinuses without    Allergen, Pecan Tree IgE    Allergen-Alternaria Alternata    Aspergillus fumagatus IgE    Bahia grass IgE    Bermuda grass IgE    Cat epithelium IgE    Cladosporium IgE    Cockroach, American IgE    Curvularia lunata IgE    D. farinae IgE    Ceasar IgE    Ragweed, short, common IgE    Plantain, English IgE    Oak, white IgE    Marsh elder, rough IgE    Scottie grass IgE    Dust, house, [place] IgE    Dust, house, Valles IgE    Dog dander IgE    D. pteronyssinus IgE      3. Nasal turbinate hypertrophy        4. Chronic nasal congestion        5. Nasal septal deviation            chronic sinusitis.     Treatment Plan      FESS, septo/smrt. If scan normal then AI referral     Rodger Chin MD  Pediatric Otolaryngology Attending

## 2023-04-18 NOTE — ANESTHESIA PREPROCEDURE EVALUATION
04/18/2023  Nico Mcpherson is a 17 y.o., male.    Patient Active Problem List   Diagnosis    BMI (body mass index), pediatric, greater than or equal to 95% for age    ETD (Eustachian tube dysfunction), right    Tinnitus, right         HPI:  HPI  Nico Mcpherson is a 17 y.o. old male here for follow up  for chronic nasal obstruction and post nasal drip, which has been present for approximately years. Seen by me almost a year ago month ago. Was started on astelin and rinses. Helpful for some time but now with chronic symptoms over last 6 months.   The patient complains of  nasal obstruction, mucoid nasal discharge, face pain, nost nasal drip, eye pain. Dental pain, and bad breath and strange smells..  he has   been on medications for the nasal symptoms including flonase and astelin and rinses.        Pre-op Assessment    I have reviewed the Patient Summary Reports.     I have reviewed the Nursing Notes. I have reviewed the NPO Status.   I have reviewed the Medications.     Review of Systems  Anesthesia Hx:  No problems with previous Anesthesia    Social:  Non-Smoker, No Alcohol Use    Hematology/Oncology:  Hematology Normal   Oncology Normal     Cardiovascular:   Exercise tolerance: good NYHA Classification I    Pulmonary:  Pulmonary Normal    Renal/:  Renal/ Normal     Hepatic/GI:  Hepatic/GI Normal    Musculoskeletal:  Musculoskeletal Normal    Neurological:  Neurology Normal    Endocrine:  Endocrine Normal  Morbid Obesity / BMI > 40  Dermatological:  Skin Normal    Psych:  Psychiatric Normal           Physical Exam  General: Well nourished    Airway:  Mallampati: I / I  Mouth Opening: Normal  TM Distance: Normal  Tongue: Normal  Neck ROM: Normal ROM    Dental:  Intact        Anesthesia Plan  Type of Anesthesia, risks & benefits discussed:    Anesthesia Type: Gen ETT  Intra-op Monitoring Plan: Standard  ASA Monitors  Post Op Pain Control Plan: multimodal analgesia  Induction:  IV  Airway Plan: Direct, Post-Induction  Informed Consent: Informed consent signed with the Patient representative and all parties understand the risks and agree with anesthesia plan.  All questions answered. Patient consented to blood products? Yes  ASA Score: 3  Day of Surgery Review of History & Physical: H&P Update referred to the surgeon/provider.    Ready For Surgery From Anesthesia Perspective.     .

## 2023-04-19 NOTE — OP NOTE
Otolaryngology- Head & Neck Surgery  Operative Report    Patient: Nico Mcpherson   Medical Record Number: [id number]   YOB: 2005   Date of service: 4/18/23      Preoperative Diagnoses:  1. Maxillary sinusitis, bilateral  2. Ethmoid sinusitis, bilateral  3. Frontal sinusitis, left  4. Septal deviation, right  5. Nasal turbinate hypertrophy    Postoperative Diagnoses:   1. Maxillary sinusitis, bilateral  2. Ethmoid sinusitis, bilateral  3. Frontal sinusitis, left  4. Septal deviation, right  5. Nasal turbinate hypertrophy    Procedures:   Endoscopic maxillary antrostomy with tissue removal, bilateral  Endoscopic total ethmoidectomy with tissue removal, bilateral  3. Endoscopic frontal recess resection, left  4. Endoscopic septoplasty  5. Submucous resection of inferior turbinates, bilateral  6. Use of intraoperative computer aided image guidance, 2 hr     Surgeon:   Rodger Chin MD  Pediatric Otolaryngology Attending       Assistants: Ike Wheeler MD       Anesthesia:   General endotracheal     Estimated blood loss:   25 ml    Complications:   None     Findings:   Disease of maxillar , ethrmoid and left frontal sinuses  Right nasal septal deviation  Nasal turbinate hypertrophy    Indications and consent:   Nico Mcpherson is a pleasant 17 y.o. who has been seen in the Pediatric Otolaryngology department at [facility] in consultation for assistance with surgical management of his sinus disease.  The risks, benefits, and alternatives to the procedures noted above were discussed with the family and informed consent was obtained.     Operative detail:     On the day of the procedure, Nico Mcpherson was brought to the operating theater and positioned supine. Proper sign in, time out, and sign out procedures were performed. Smooth induction of general endotracheal anesthesia was performed by the Anesthesia service. Perioperative analgesia and vasoconstriction were accomplished via intranasal injection  with 1% lidocaine with 1:100,000 parts epinephrine. Afrin pledgets were also applied.     The preoperative image guidance protocol CT scan was reviewed and the correct patient information was confirmed. The scans were loaded onto the image guidance system and the system was calibrated and found to be functioning appropriately.  This was used for the remainder of the procedure to avoid injury to vital structures including the lamina papyracea, carotid artery, and other vascular and neural structures.      The patient was draped in standard fashion and nasal endoscopy was performed. Throughout the procedure a 0-degree telescope was utilized to visualize the sinus cavities. Injection of the uncinate process, the middle turbinate and the axillae of the middle turbinates with 1% lidocaine with 1:100,000 parts epinephrine was accomplished bilaterally. Afrin pledgets were placed in the middle meatus bilaterally for additional decongestion.     Next we proceeded with right maxillary antrostomy with tissue removal. The middle turbinate was gently medialized and the uncinate process was identified. The uncinate was removed using a combination of sharp and powered instrumentation and the natural maxillary ostium was identified.  The natural ostium opening was widened with a microdebrider. Sinus contents were removed.      We proceeded with right total ethmoidectomy. The ethmoid bulla was visually confirmed and confirmed with image guidance. The bulla was then resected from inferior and medial to lateral and superior, taking care to avoid injury to the lamina papyracea. The superior ethmoid cells were resected as well, revealing the ground lamella of the middle turbinate. This was entered low and medial, entering the posterior ethmoid system and the posterior ethmoid cells were exenterated taking care to avoid injury to the skull base superiorly, turbinate medially and the lamina laterally. Image guidance was used to confirm  completeness of dissection.      Attention was then turned to left maxillary antrostomy with tissue removal. The middle turbinate was gently medialized and the uncinate process was identified. The uncinate was removed using a combination of sharp and powered instrumentation and the natural maxillary ostium was identified.  The natural ostium opening was widened with a microdebrider. Sinus contents were removed.      We proceeded with left total ethmoidectomy. The ethmoid bulla was visually confirmed and confirmed with image guidance. The bulla was then resected from inferior and medial to lateral and superior, taking care to avoid injury to the lamina papyracea. The superior ethmoid cells were resected as well, revealing the ground lamella of the middle turbinate. This was entered low and medial, entering the posterior ethmoid system and the posterior ethmoid cells were exenterated taking care to avoid injury to the skull base superiorly, turbinate medially and the lamina laterally. Image guidance was used to confirm completeness of dissection.      We then proceeded with the frontal recess resection  on the left. The crotch of the middle turbinate and its anterior most connection to the sidewall taken down with a through cut. Next agger nassi cells resected. Using an angled camera the frontal recess was examined and open.The sinuses were copiously irrigated with saline.  Epinephrine pledgets were then place in the sinus cavities for hemostasis.     Pledgets were removed     Next we proceeded with the septoplasty. Endoscope introduced. An ame-transfixion incision was created on the left side of the septum. A mucoperichondrial flap was enveloped with an elevator. Next, an incision was made into the deviated cartilage with the elevator. The contralateral mucoperichondrial flap was then enveloped.  Next, the deviated cartilaginous and bony septum was incised with a scissor. The deviated septal pieces were removed. A  generous caudal cartilage strut was maintained.   T     Next, attention was then turned toward submucous resection of the inferior turbinates.   The microdebrider was inserted into the left inferior turbinate and submucous resection was carried out. After the turbinate was sufficiently reduced, attention was then turned to the right.The microdebrider was inserted into the right inferior turbinate and submucous resection was carried out. After the turbinate was sufficiently reduced the procedure was concluded.     At this point, the goals of the procedure were complete. The endoscope and instruments were removed from the patient. The care of the patient was turned over to the Anesthesia service where he was awakened, extubated, and transferred to the PACU.  Dr. Chin was present and actively participated in the entirety of the dictated procedure

## 2023-04-20 NOTE — ANESTHESIA RELEASE NOTE
"Anesthesia Release from PACU Note    Patient: Nico Mcpherson    Procedure(s) Performed: Procedure(s) (LRB):  FESS, USING COMPUTER-ASSISTED NAVIGATION (N/A)  SEPTOPLASTY, NOSE (N/A)  REDUCTION, NASAL TURBINATE (Bilateral)    Anesthesia type: general    Post pain: Adequate analgesia    Post assessment: no apparent anesthetic complications and tolerated procedure well    Last Vitals:   Visit Vitals  BP (!) 120/54   Pulse 91   Temp 36.8 °C (98.2 °F) (Temporal)   Resp 20   Ht 5' 10" (1.778 m)   Wt (!) 154.2 kg (340 lb)   SpO2 (!) 94%   BMI 48.78 kg/m²       Post vital signs: stable    Level of consciousness: awake and alert     Nausea/Vomiting: no nausea/no vomiting    Complications: none    Airway Patency: patent    Respiratory: unassisted, spontaneous ventilation, room air    Cardiovascular: stable and blood pressure at baseline    Hydration: euvolemic  "

## 2023-04-20 NOTE — ANESTHESIA POSTPROCEDURE EVALUATION
Anesthesia Post Evaluation    Patient: [facility]    Procedure(s) Performed: Procedure(s) (LRB):  FESS, USING COMPUTER-ASSISTED NAVIGATION (N/A)  SEPTOPLASTY, NOSE (N/A)  REDUCTION, NASAL TURBINATE (Bilateral)    Final Anesthesia Type: general      Patient location during evaluation: PACU  Patient participation: Yes- Able to Participate  Level of consciousness: awake and alert  Post-procedure vital signs: reviewed and stable  Pain management: adequate  Airway patency: patent    PONV status at discharge: No PONV  Anesthetic complications: no      Cardiovascular status: hemodynamically stable  Respiratory status: unassisted, spontaneous ventilation and room air  Hydration status: euvolemic  Follow-up not needed.          Vitals Value Taken Time   /54 04/18/23 1701   Temp 36.8 °C (98.2 °F) 04/18/23 1622   Pulse 90 04/18/23 1716   Resp 20 04/18/23 1715   SpO2 96 % 04/18/23 1716   Vitals shown include unvalidated device data.      No case tracking events are documented in the log.      Pain/Scott Score: No data recorded

## 2023-04-29 DIAGNOSIS — F41.9 ANXIOUSNESS: ICD-10-CM

## 2023-05-01 RX ORDER — HYDROXYZINE HYDROCHLORIDE 25 MG/1
25 TABLET, FILM COATED ORAL NIGHTLY PRN
Qty: 30 TABLET | Refills: 3 | Status: SHIPPED | OUTPATIENT
Start: 2023-05-01 | End: 2023-08-31 | Stop reason: SDUPTHER

## 2023-05-03 ENCOUNTER — OFFICE VISIT (OUTPATIENT)
Dept: OTOLARYNGOLOGY | Facility: CLINIC | Age: 18
End: 2023-05-03
Payer: MEDICAID

## 2023-05-03 VITALS — WEIGHT: 315 LBS | BODY MASS INDEX: 48.78 KG/M2

## 2023-05-03 DIAGNOSIS — J30.89 NON-SEASONAL ALLERGIC RHINITIS, UNSPECIFIED TRIGGER: Primary | ICD-10-CM

## 2023-05-03 PROCEDURE — 3008F PR BODY MASS INDEX (BMI) DOCUMENTED: ICD-10-PCS | Mod: CPTII,,, | Performed by: OTOLARYNGOLOGY

## 2023-05-03 PROCEDURE — 1159F MED LIST DOCD IN RCRD: CPT | Mod: CPTII,,, | Performed by: OTOLARYNGOLOGY

## 2023-05-03 PROCEDURE — 99212 OFFICE O/P EST SF 10 MIN: CPT | Mod: PBBFAC,PO | Performed by: OTOLARYNGOLOGY

## 2023-05-03 PROCEDURE — 1159F PR MEDICATION LIST DOCUMENTED IN MEDICAL RECORD: ICD-10-PCS | Mod: CPTII,,, | Performed by: OTOLARYNGOLOGY

## 2023-05-03 PROCEDURE — 99024 POSTOP FOLLOW-UP VISIT: CPT | Mod: ,,, | Performed by: OTOLARYNGOLOGY

## 2023-05-03 PROCEDURE — 3008F BODY MASS INDEX DOCD: CPT | Mod: CPTII,,, | Performed by: OTOLARYNGOLOGY

## 2023-05-03 PROCEDURE — 3044F HG A1C LEVEL LT 7.0%: CPT | Mod: CPTII,,, | Performed by: OTOLARYNGOLOGY

## 2023-05-03 PROCEDURE — 99024 PR POST-OP FOLLOW-UP VISIT: ICD-10-PCS | Mod: ,,, | Performed by: OTOLARYNGOLOGY

## 2023-05-03 PROCEDURE — 99999 PR PBB SHADOW E&M-EST. PATIENT-LVL II: ICD-10-PCS | Mod: PBBFAC,,, | Performed by: OTOLARYNGOLOGY

## 2023-05-03 PROCEDURE — 3044F PR MOST RECENT HEMOGLOBIN A1C LEVEL <7.0%: ICD-10-PCS | Mod: CPTII,,, | Performed by: OTOLARYNGOLOGY

## 2023-05-03 PROCEDURE — 99999 PR PBB SHADOW E&M-EST. PATIENT-LVL II: CPT | Mod: PBBFAC,,, | Performed by: OTOLARYNGOLOGY

## 2023-05-03 NOTE — PROGRESS NOTES
Pediatric Otolaryngology- Head & Neck Surgery   Established Patient Visit      Chief Complaint: Follow up  FESS, SMRT and septo    HPI  Nico Mcpherson is a 17 y.o. old male here for follow up FESS, SMRT and septo. :Breathing better. PND resolved.          he has   history of allergies, has had previous allergy testing.  Allergies do   run in the family.      The patient has has an adenoidectomy in the past.         Medical History  Past Medical History:   Diagnosis Date    BMI (body mass index), pediatric, greater than or equal to 95% for age 8/12/2014       Surgical History  Past Surgical History:   Procedure Laterality Date    ADENOIDECTOMY      FUNCTIONAL ENDOSCOPIC SINUS SURGERY (FESS) USING COMPUTER-ASSISTED NAVIGATION N/A 4/18/2023    Procedure: FESS, USING COMPUTER-ASSISTED NAVIGATION;  Surgeon: Rodger Chin MD;  Location: [facility] OR [address] [place] [address];  Service: ENT;  Laterality: N/A;    NASAL SEPTOPLASTY N/A 4/18/2023    Procedure: SEPTOPLASTY, NOSE;  Surgeon: Rodger Chin MD;  Location: [facility] OR [address] [place] [address];  Service: ENT;  Laterality: N/A;    NASAL TURBINATE REDUCTION Bilateral 4/18/2023    Procedure: REDUCTION, NASAL TURBINATE;  Surgeon: Rodger Chin MD;  Location: [facility] OR [address] [place] [address];  Service: ENT;  Laterality: Bilateral;    REMOVAL OF NAIL OF DIGIT Left 9/20/2018    Procedure: REMOVAL, NAIL, DIGIT;  Surgeon: Blayne العراقي DPM;  Location: [facility] OR;  Service: Podiatry;  Laterality: Left;    toenail surgery Bilateral     removal x2    TONSILLECTOMY         Medications  Current Outpatient Medications on File Prior to Visit   Medication Sig Dispense Refill    hydrOXYzine HCL (ATARAX) 25 MG tablet Take 1 tablet (25 mg total) by mouth nightly as needed. (Patient not taking: Reported on 5/3/2023) 30 tablet 3    omega-3 fatty acids/fish oil (FISH OIL-OMEGA-3 FATTY ACIDS) 300-1,000 mg capsule Take by mouth once daily. Rarely takes       No current facility-administered medications on file prior to visit.        Allergies  Review of patient's allergies indicates:   Allergen Reactions    Whey Swelling     Tongue swelling; denies trouble breathing       Social History  There are no smokers in the home    Family History  There is no family history of bleeding disorders or problems with anesthesia.           Physical Exam  General:  Alert, well developed, comfortable, mouth breathing  Voice:  Regular for age, good volume  Respiratory:  Symmetric breathing, no stridor, no distress  Head:  Normocephalic, no lesions  Face: Symmetric, HB 1/6 bilat, no lesions, no obvious sinus tenderness, salivary glands nontender  Eyes:  Sclera white, extraocular movements intact  Nose: Dorsum straight, septum straight,nomral turbinate size,red mucosa  Right Ear: Pinna and external ear appears normal, EAC patent, TM intact, mobile, without middle ear effusion  Left Ear: Pinna and external ear appears normal, EAC patent, TM intact, mobile, without middle ear effusion  Hearing:  Grossly intact  Oral cavity: Healthy mucosa, no masses or lesions including lips, teeth, gums, floor of mouth, palate, or tongue.  Oropharynx: Tonsils absent, palate intact, normal pharyngeal wall movement  Neck: Supple, no palpable nodes, no masses, trachea midline, no thyroid masses  Cardiovascular system:  Pulses regular in both upper extremities, good skin turgor   Neuro: CN II-XII intact, moves all extremities spontaneously  Skin: no rash    Procedure:  Nose debrided of minor amount of mucous       Impression  1. Non-seasonal allergic rhinitis, unspecified trigger            chronic sinusitis and nasal obstruction s/p FESS, SMRT and septo. Doing well  Treatment Plan   - cont rinses  - rtc prn  Rodger Chin MD  Pediatric Otolaryngology Attending

## 2023-08-31 DIAGNOSIS — F41.9 ANXIOUSNESS: ICD-10-CM

## 2023-08-31 RX ORDER — HYDROXYZINE HYDROCHLORIDE 25 MG/1
25 TABLET, FILM COATED ORAL NIGHTLY PRN
Qty: 30 TABLET | Refills: 3 | Status: CANCELLED | OUTPATIENT
Start: 2023-08-31

## 2023-09-01 DIAGNOSIS — F41.9 ANXIOUSNESS: ICD-10-CM

## 2023-09-01 RX ORDER — HYDROXYZINE HYDROCHLORIDE 25 MG/1
25 TABLET, FILM COATED ORAL NIGHTLY PRN
Qty: 30 TABLET | Refills: 3 | Status: SHIPPED | OUTPATIENT
Start: 2023-09-01 | End: 2024-01-01 | Stop reason: SDUPTHER

## 2023-10-01 ENCOUNTER — PATIENT MESSAGE (OUTPATIENT)
Dept: PEDIATRICS | Facility: CLINIC | Age: 18
End: 2023-10-01
Payer: MEDICAID

## 2023-11-13 ENCOUNTER — OFFICE VISIT (OUTPATIENT)
Dept: URGENT CARE | Facility: CLINIC | Age: 18
End: 2023-11-13
Payer: MEDICAID

## 2023-11-13 VITALS
HEART RATE: 75 BPM | WEIGHT: 315 LBS | SYSTOLIC BLOOD PRESSURE: 155 MMHG | DIASTOLIC BLOOD PRESSURE: 88 MMHG | RESPIRATION RATE: 16 BRPM | TEMPERATURE: 98 F | BODY MASS INDEX: 45.1 KG/M2 | HEIGHT: 70 IN

## 2023-11-13 DIAGNOSIS — J00 ACUTE NASOPHARYNGITIS: ICD-10-CM

## 2023-11-13 DIAGNOSIS — J02.9 SORE THROAT: ICD-10-CM

## 2023-11-13 DIAGNOSIS — H10.33 ACUTE CONJUNCTIVITIS OF BOTH EYES, UNSPECIFIED ACUTE CONJUNCTIVITIS TYPE: Primary | ICD-10-CM

## 2023-11-13 LAB
CTP QC/QA: YES
MOLECULAR STREP A: NEGATIVE

## 2023-11-13 PROCEDURE — 87651 STREP A DNA AMP PROBE: CPT | Mod: QW,S$GLB,, | Performed by: NURSE PRACTITIONER

## 2023-11-13 PROCEDURE — 99203 PR OFFICE/OUTPT VISIT, NEW, LEVL III, 30-44 MIN: ICD-10-PCS | Mod: 95,S$GLB,, | Performed by: NURSE PRACTITIONER

## 2023-11-13 PROCEDURE — 87651 POCT STREP A MOLECULAR: ICD-10-PCS | Mod: QW,S$GLB,, | Performed by: NURSE PRACTITIONER

## 2023-11-13 PROCEDURE — 99203 OFFICE O/P NEW LOW 30 MIN: CPT | Mod: 95,S$GLB,, | Performed by: NURSE PRACTITIONER

## 2023-11-13 RX ORDER — POLYMYXIN B SULFATE AND TRIMETHOPRIM 1; 10000 MG/ML; [USP'U]/ML
1 SOLUTION OPHTHALMIC EVERY 6 HOURS
Qty: 10 ML | Refills: 0 | Status: SHIPPED | OUTPATIENT
Start: 2023-11-13 | End: 2024-01-02

## 2023-11-13 NOTE — PROGRESS NOTES
"Subjective:      Patient ID: Nico Mcpherson is a 18 y.o. male.    Vitals:  height is 5' 10" (1.778 m) and weight is 147.4 kg (325 lb) (abnormal). His oral temperature is 98 °F (36.7 °C). His blood pressure is 155/88 (abnormal) and his pulse is 75. His respiration is 16.     Chief Complaint: Eye Problem    Pt states bilateral eye redness, cough and post nasal drip for 5 days. States throat hurts denies fever at home OTC meds taking nasal spray.  Body aches but no fever.  Previously worked at [employer], not working right now.  Denies known sick contacts.      Eye Problem   Both eyes are affected. This is a new problem. The current episode started in the past 7 days. The problem occurs constantly. The problem has been gradually worsening. There was no injury mechanism. The pain is at a severity of 0/10. The patient is experiencing no pain. There is No known exposure to pink eye. He Does not wear contacts. Associated symptoms include an eye discharge, eye redness and a recent URI. Pertinent negatives include no blurred vision, double vision, fever, foreign body sensation, itching, nausea, photophobia or vomiting. He has tried nothing for the symptoms. The treatment provided no relief.   URI   This is a new problem. The problem has been unchanged. There has been no fever. Associated symptoms include congestion, coughing and a sore throat. Pertinent negatives include no abdominal pain, chest pain, diarrhea, dysuria, ear pain, headaches, joint pain, joint swelling, nausea, neck pain, plugged ear sensation, rash, rhinorrhea, sinus pain, sneezing, swollen glands, vomiting or wheezing.       Constitution: Negative for fever.   HENT:  Positive for congestion and sore throat. Negative for ear pain and sinus pain.    Neck: Negative for neck pain.   Cardiovascular:  Negative for chest pain.   Eyes:  Positive for eye discharge and eye redness. Negative for eye itching, photophobia, double vision and blurred vision.   Respiratory:  " Positive for cough. Negative for wheezing.    Gastrointestinal:  Negative for abdominal pain, nausea, vomiting and diarrhea.   Genitourinary:  Negative for dysuria.   Skin:  Negative for rash.   Allergic/Immunologic: Negative for sneezing.   Neurological:  Negative for headaches.      Objective:     Physical Exam   Constitutional: He is oriented to person, place, and time. He appears well-developed.  Non-toxic appearance. He does not appear ill. No distress.   HENT:   Head: Normocephalic and atraumatic.   Ears:   Right Ear: Tympanic membrane, external ear and ear canal normal.   Left Ear: Tympanic membrane, external ear and ear canal normal.   Nose: Nose normal.   Mouth/Throat: Oropharynx is clear and moist.   Eyes: EOM and lids are normal. Pupils are equal, round, and reactive to light. No visual field deficit is present. Right eye exhibits discharge. Right eye exhibits no hordeolum. No foreign body present in the right eye. Left eye exhibits discharge. Left eye exhibits no hordeolum. No foreign body present in the left eye. Right conjunctiva is injected. Left conjunctiva is injected. Extraocular movement intact vision grossly intact   Neck: Trachea normal and phonation normal. Neck supple.   Musculoskeletal: Normal range of motion.         General: Normal range of motion.   Neurological: He is alert and oriented to person, place, and time.   Skin: Skin is warm, dry and intact.   Psychiatric: His speech is normal and behavior is normal. Judgment and thought content normal.   Nursing note and vitals reviewed.    The patient location is: [facility]  The chief complaint leading to consultation is: Eye redness    Visit type: audiovisual    Face to Face time with patient: 15 mins  20 mins minutes of total time spent on the encounter, which includes face to face time and non-face to face time preparing to see the patient (eg, review of tests), Obtaining and/or reviewing separately obtained history, Documenting clinical  information in the electronic or other health record, Independently interpreting results (not separately reported) and communicating results to the patient/family/caregiver, or Care coordination (not separately reported).         Each patient to whom he or she provides medical services by telemedicine is:  (1) informed of the relationship between the physician and patient and the respective role of any other health care provider with respect to management of the patient; and (2) notified that he or she may decline to receive medical services by telemedicine and may withdraw from such care at any time.    Notes:    Assessment:      1. Acute conjunctivitis of both eyes, unspecified acute conjunctivitis type    2. Sore throat    3. Acute nasopharyngitis        Plan:     Results for orders placed or performed in visit on 11/13/23   POCT Strep A, Molecular   Result Value Ref Range    Molecular Strep A, POC Negative Negative     Acceptable Yes      Vision Screening    Right eye Left eye Both eyes   Without correction 20/50 20/50 20/50   With correction          Does not have his glasses w/ him.  Denies visual changes.  Denies trauma.  Denies light sensitivity.  Does not wear contacts.    Acute conjunctivitis of both eyes, unspecified acute conjunctivitis type  -     polymyxin B sulf-trimethoprim (POLYTRIM) 10,000 unit- 1 mg/mL Drop; Place 1 drop into both eyes every 6 (six) hours. for 7 days  Dispense: 10 mL; Refill: 0    Sore throat  -     POCT Strep A, Molecular    Acute nasopharyngitis        Patient Instructions   URI:  Please drink plenty of fluids.  Please get plenty of rest.  Please return here or go to the Emergency Department for any concerns or worsening of condition.  If you do not have Hypertension or any history of palpitations, it is ok to take over the counter Sudafed or Mucinex D or Allegra-D or Claritin-D or Zyrtec-D.  If you do take one of the above, it is ok to combine that with plain  over the counter Mucinex or Allegra or Claritin or Zyrtec.  If for example you are taking Zyrtec -D, you can combine that with Mucinex, but not Mucinex-D.  If you are taking Mucinex-D, you can combine that with plain Allegra or Claritin or Zyrtec.   If you do have Hypertension or palpitations, it is safe to take Coricidin HBP for relief of sinus symptoms.  If not allergic, please take over the counter Tylenol (Acetaminophen) and/or Motrin (Ibuprofen) as directed for control of pain and/or fever.  Please follow up with your primary care doctor or specialist as needed.    If you  smoke, please stop smoking.      EYE:  If your condition worsens or fails to improve we recommend that you receive another evaluation at the ER immediately or contact your PCP to discuss your concerns or return here. You must understand that you've received an urgent care treatment only and that you may be released before all your medical problems are known or treated. You the patient will arrange for followup care as instructed.   If you develop increase eye symptoms or change in your vision seek medical care immediately either with your ophthalomologist or the ER or return here.

## 2023-11-13 NOTE — PATIENT INSTRUCTIONS
URI:  Please drink plenty of fluids.  Please get plenty of rest.  Please return here or go to the Emergency Department for any concerns or worsening of condition.  If you do not have Hypertension or any history of palpitations, it is ok to take over the counter Sudafed or Mucinex D or Allegra-D or Claritin-D or Zyrtec-D.  If you do take one of the above, it is ok to combine that with plain over the counter Mucinex or Allegra or Claritin or Zyrtec.  If for example you are taking Zyrtec -D, you can combine that with Mucinex, but not Mucinex-D.  If you are taking Mucinex-D, you can combine that with plain Allegra or Claritin or Zyrtec.   If you do have Hypertension or palpitations, it is safe to take Coricidin HBP for relief of sinus symptoms.  If not allergic, please take over the counter Tylenol (Acetaminophen) and/or Motrin (Ibuprofen) as directed for control of pain and/or fever.  Please follow up with your primary care doctor or specialist as needed.    If you  smoke, please stop smoking.      EYE:  If your condition worsens or fails to improve we recommend that you receive another evaluation at the ER immediately or contact your PCP to discuss your concerns or return here. You must understand that you've received an urgent care treatment only and that you may be released before all your medical problems are known or treated. You the patient will arrange for followup care as instructed.   If you develop increase eye symptoms or change in your vision seek medical care immediately either with your ophthalomologist or the ER or return here.

## 2024-01-01 DIAGNOSIS — F41.9 ANXIOUSNESS: ICD-10-CM

## 2024-01-01 DIAGNOSIS — H10.33 ACUTE CONJUNCTIVITIS OF BOTH EYES, UNSPECIFIED ACUTE CONJUNCTIVITIS TYPE: ICD-10-CM

## 2024-01-03 RX ORDER — HYDROXYZINE HYDROCHLORIDE 25 MG/1
25 TABLET, FILM COATED ORAL NIGHTLY PRN
Qty: 30 TABLET | Refills: 3 | Status: SHIPPED | OUTPATIENT
Start: 2024-01-03

## 2024-01-04 RX ORDER — POLYMYXIN B SULFATE AND TRIMETHOPRIM 1; 10000 MG/ML; [USP'U]/ML
1 SOLUTION OPHTHALMIC EVERY 6 HOURS
Qty: 10 ML | Refills: 0 | OUTPATIENT
Start: 2024-01-04 | End: 2024-01-11

## 2024-02-14 ENCOUNTER — OFFICE VISIT (OUTPATIENT)
Dept: PEDIATRICS | Facility: CLINIC | Age: 19
End: 2024-02-14
Payer: MEDICAID

## 2024-02-14 ENCOUNTER — HOSPITAL ENCOUNTER (OUTPATIENT)
Dept: RADIOLOGY | Facility: HOSPITAL | Age: 19
Discharge: HOME | End: 2024-02-14
Attending: PEDIATRICS
Payer: MEDICAID

## 2024-02-14 VITALS
DIASTOLIC BLOOD PRESSURE: 77 MMHG | HEART RATE: 79 BPM | TEMPERATURE: 97 F | SYSTOLIC BLOOD PRESSURE: 126 MMHG | WEIGHT: 315 LBS | HEIGHT: 70 IN | RESPIRATION RATE: 19 BRPM | BODY MASS INDEX: 45.1 KG/M2

## 2024-02-14 DIAGNOSIS — D22.9 MULTIPLE PIGMENTED NEVI: ICD-10-CM

## 2024-02-14 DIAGNOSIS — Z00.00 ANNUAL PHYSICAL EXAM: ICD-10-CM

## 2024-02-14 DIAGNOSIS — E66.01 MORBID OBESITY: ICD-10-CM

## 2024-02-14 DIAGNOSIS — M79.662 PAIN IN LEFT LOWER LEG: Primary | ICD-10-CM

## 2024-02-14 DIAGNOSIS — M79.662 PAIN IN LEFT LOWER LEG: ICD-10-CM

## 2024-02-14 PROCEDURE — 99212 OFFICE O/P EST SF 10 MIN: CPT | Mod: 25,S$PBB,, | Performed by: PEDIATRICS

## 2024-02-14 PROCEDURE — 73590 X-RAY EXAM OF LOWER LEG: CPT | Mod: TC,FY,PO,LT

## 2024-02-14 PROCEDURE — 1160F RVW MEDS BY RX/DR IN RCRD: CPT | Mod: CPTII,,, | Performed by: PEDIATRICS

## 2024-02-14 PROCEDURE — 99214 OFFICE O/P EST MOD 30 MIN: CPT | Mod: PBBFAC,25,PN | Performed by: PEDIATRICS

## 2024-02-14 PROCEDURE — 73590 X-RAY EXAM OF LOWER LEG: CPT | Mod: 26,LT,, | Performed by: RADIOLOGY

## 2024-02-14 PROCEDURE — 99999 PR PBB SHADOW E&M-EST. PATIENT-LVL IV: CPT | Mod: PBBFAC,,, | Performed by: PEDIATRICS

## 2024-02-14 PROCEDURE — 3078F DIAST BP <80 MM HG: CPT | Mod: CPTII,,, | Performed by: PEDIATRICS

## 2024-02-14 PROCEDURE — 99395 PREV VISIT EST AGE 18-39: CPT | Mod: 25,S$PBB,, | Performed by: PEDIATRICS

## 2024-02-14 PROCEDURE — 3074F SYST BP LT 130 MM HG: CPT | Mod: CPTII,,, | Performed by: PEDIATRICS

## 2024-02-14 PROCEDURE — 1159F MED LIST DOCD IN RCRD: CPT | Mod: CPTII,,, | Performed by: PEDIATRICS

## 2024-02-14 PROCEDURE — 3008F BODY MASS INDEX DOCD: CPT | Mod: CPTII,,, | Performed by: PEDIATRICS

## 2024-02-14 PROCEDURE — 3044F HG A1C LEVEL LT 7.0%: CPT | Mod: CPTII,,, | Performed by: PEDIATRICS

## 2024-02-15 ENCOUNTER — LAB VISIT (OUTPATIENT)
Dept: LAB | Facility: HOSPITAL | Age: 19
End: 2024-02-15
Attending: PEDIATRICS
Payer: MEDICAID

## 2024-02-15 DIAGNOSIS — E66.01 MORBID OBESITY: ICD-10-CM

## 2024-02-15 LAB
ALBUMIN SERPL BCP-MCNC: 3.7 G/DL (ref 3.5–5.2)
ALP SERPL-CCNC: 56 U/L (ref 55–135)
ALT SERPL W/O P-5'-P-CCNC: 35 U/L (ref 10–44)
ANION GAP SERPL CALC-SCNC: 9 MMOL/L (ref 8–16)
AST SERPL-CCNC: 21 U/L (ref 10–40)
BASOPHILS # BLD AUTO: 0.04 K/UL (ref 0–0.2)
BASOPHILS NFR BLD: 0.4 % (ref 0–1.9)
BILIRUB SERPL-MCNC: 0.3 MG/DL (ref 0.1–1)
BUN SERPL-MCNC: 12 MG/DL (ref 6–20)
CALCIUM SERPL-MCNC: 9.6 MG/DL (ref 8.7–10.5)
CHLORIDE SERPL-SCNC: 105 MMOL/L (ref 95–110)
CHOLEST SERPL-MCNC: 186 MG/DL (ref 120–199)
CHOLEST/HDLC SERPL: 6.9 {RATIO} (ref 2–5)
CO2 SERPL-SCNC: 26 MMOL/L (ref 23–29)
CREAT SERPL-MCNC: 0.9 MG/DL (ref 0.5–1.4)
DIFFERENTIAL METHOD BLD: NORMAL
EOSINOPHIL # BLD AUTO: 0.2 K/UL (ref 0–0.5)
EOSINOPHIL NFR BLD: 2.3 % (ref 0–8)
ERYTHROCYTE [DISTWIDTH] IN BLOOD BY AUTOMATED COUNT: 11.9 % (ref 11.5–14.5)
EST. GFR  (NO RACE VARIABLE): >60 ML/MIN/1.73 M^2
ESTIMATED AVG GLUCOSE: 103 MG/DL (ref 68–131)
GLUCOSE SERPL-MCNC: 87 MG/DL (ref 70–110)
HBA1C MFR BLD: 5.2 % (ref 4–5.6)
HCT VFR BLD AUTO: 45.4 % (ref 40–54)
HDLC SERPL-MCNC: 27 MG/DL (ref 40–75)
HDLC SERPL: 14.5 % (ref 20–50)
HGB BLD-MCNC: 15 G/DL (ref 14–18)
IMM GRANULOCYTES # BLD AUTO: 0.03 K/UL (ref 0–0.04)
IMM GRANULOCYTES NFR BLD AUTO: 0.3 % (ref 0–0.5)
LDLC SERPL CALC-MCNC: 146.2 MG/DL (ref 63–159)
LYMPHOCYTES # BLD AUTO: 2.3 K/UL (ref 1–4.8)
LYMPHOCYTES NFR BLD: 25.7 % (ref 18–48)
MCH RBC QN AUTO: 27.2 PG (ref 27–31)
MCHC RBC AUTO-ENTMCNC: 33 G/DL (ref 32–36)
MCV RBC AUTO: 82 FL (ref 82–98)
MONOCYTES # BLD AUTO: 0.8 K/UL (ref 0.3–1)
MONOCYTES NFR BLD: 8.9 % (ref 4–15)
NEUTROPHILS # BLD AUTO: 5.6 K/UL (ref 1.8–7.7)
NEUTROPHILS NFR BLD: 62.4 % (ref 38–73)
NONHDLC SERPL-MCNC: 159 MG/DL
NRBC BLD-RTO: 0 /100 WBC
PLATELET # BLD AUTO: 312 K/UL (ref 150–450)
PMV BLD AUTO: 9.7 FL (ref 9.2–12.9)
POTASSIUM SERPL-SCNC: 4.3 MMOL/L (ref 3.5–5.1)
PROT SERPL-MCNC: 7.2 G/DL (ref 6–8.4)
RBC # BLD AUTO: 5.51 M/UL (ref 4.6–6.2)
SODIUM SERPL-SCNC: 140 MMOL/L (ref 136–145)
T4 FREE SERPL-MCNC: 1.09 NG/DL (ref 0.71–1.51)
TRIGL SERPL-MCNC: 64 MG/DL (ref 30–150)
TSH SERPL DL<=0.005 MIU/L-ACNC: 1.32 UIU/ML (ref 0.4–4)
WBC # BLD AUTO: 9.06 K/UL (ref 3.9–12.7)

## 2024-02-15 PROCEDURE — 83036 HEMOGLOBIN GLYCOSYLATED A1C: CPT | Performed by: PEDIATRICS

## 2024-02-15 PROCEDURE — 36415 COLL VENOUS BLD VENIPUNCTURE: CPT | Mod: PO | Performed by: PEDIATRICS

## 2024-02-15 PROCEDURE — 80061 LIPID PANEL: CPT | Performed by: PEDIATRICS

## 2024-02-15 PROCEDURE — 84443 ASSAY THYROID STIM HORMONE: CPT | Performed by: PEDIATRICS

## 2024-02-15 PROCEDURE — 80053 COMPREHEN METABOLIC PANEL: CPT | Mod: PO | Performed by: PEDIATRICS

## 2024-02-15 PROCEDURE — 84439 ASSAY OF FREE THYROXINE: CPT | Performed by: PEDIATRICS

## 2024-02-15 PROCEDURE — 85025 COMPLETE CBC W/AUTO DIFF WBC: CPT | Mod: PO | Performed by: PEDIATRICS

## 2024-02-25 NOTE — PROGRESS NOTES
18 y.o. WELL CHILD CHECKUP    Nico Mcpherson is a 18 y.o. male who presents to the office today for routine health care examination.    SUBJECTIVE  Concerns: Yes     Separate Visit Concerns: multiple moles to scalp, wants them removed. Also with chronic pain to L lower leg. Has been going on for years. No fever    Well Visit:    Dental Home: Yes   Social History     Social History Narrative    Lives with family and has pets. Non smokers     Education: doing well in college    Past Medical History:   Diagnosis Date    BMI (body mass index), pediatric, greater than or equal to 95% for age 8/12/2014     Past Surgical History:   Procedure Laterality Date    ADENOIDECTOMY      FUNCTIONAL ENDOSCOPIC SINUS SURGERY (FESS) USING COMPUTER-ASSISTED NAVIGATION N/A 4/18/2023    Procedure: FESS, USING COMPUTER-ASSISTED NAVIGATION;  Surgeon: Rodger Chin MD;  Location: [facility] OR [address] [place] [address];  Service: ENT;  Laterality: N/A;    NASAL SEPTOPLASTY N/A 4/18/2023    Procedure: SEPTOPLASTY, NOSE;  Surgeon: Rodger Chin MD;  Location: [facility] OR [address] [place] [address];  Service: ENT;  Laterality: N/A;    NASAL TURBINATE REDUCTION Bilateral 4/18/2023    Procedure: REDUCTION, NASAL TURBINATE;  Surgeon: Rodger Chin MD;  Location: [facility] OR [address] [place] [address];  Service: ENT;  Laterality: Bilateral;    REMOVAL OF NAIL OF DIGIT Left 9/20/2018    Procedure: REMOVAL, NAIL, DIGIT;  Surgeon: Blayne العراقي DPM;  Location: [facility];  Service: Podiatry;  Laterality: Left;    toenail surgery Bilateral     removal x2    TONSILLECTOMY         ROS:   Nutrition: well balanced, + milk, + fruits/veggies, + meat  Voiding and stooling well:  Yes   Sleep concerns: No       OBJECTIVE:   Facility age limit for growth %jose is 20 years.  Facility age limit for growth %jose is 18 years.    PHYSICAL  GENERAL: WDWN male  EYES: PERRLA, EOMI, Normal tracking and conjugate gaze, +red reflex b/l, normal cover/uncover test   EARS: TM's gray, normal EAC's bilat without excessive cerumen  VISION and  HEARING: Subjective Normal.  NOSE: nasal passages clear  OP: healthy dentition, tonsils are normal size   NECK: supple, no masses, no lymphadenopathy, no thyroid prominence  RESP: clear to auscultation bilaterally, no wheezes or rhonchi  CV: RRR, normal S1/S2, no murmurs, clicks, or rubs. 2+ distal radial pulses  ABD: soft, nontender, no masses, no hepatosplenomegaly  : normal male, testes descended bilaterally, no inguinal hernia, no hydrocele  MS: spine straight, FROM all joints  SKIN: multiple pigmented nevi to head    ASSESSMENT:   Well Child    PLAN:   Nico was seen today for well child.    Diagnoses and all orders for this visit:    Pain in left lower leg  -     X-Ray Tibia Fibula 2 View Left; Future  -     Ambulatory referral/consult to Orthopedics; Future    Multiple pigmented nevi  -     Ambulatory referral/consult to Dermatology; Future    Annual physical exam    Morbid obesity  -     CBC Auto Differential; Future  -     Comprehensive Metabolic Panel; Future  -     Hemoglobin A1C; Future  -     TSH; Future  -     T4, Free; Future  -     Lipid Panel; Future        IUTD  Passed vision  Age appropriate physical activity and nutritional counseling were completed during today's visit.  Age appropriate physical activity and nutritional counseling were completed during today's visit.    Anticipatory Guidance:  - dental visits q6 months  - safety: seat  belts, car safety; saying no to drugs      Follow up as needed.  Return for in 1 year for well visit.

## 2024-03-01 ENCOUNTER — TELEPHONE (OUTPATIENT)
Dept: PODIATRY | Facility: CLINIC | Age: 19
End: 2024-03-01
Payer: MEDICAID

## 2024-03-01 NOTE — TELEPHONE ENCOUNTER
----- Message from Jacklyn Doherty sent at 3/1/2024  2:34 PM CST -----  Contact: Yamileth Calero  Type:  Needs Medical Advice    Who Called: yamileth Villalta  Symptoms (please be specific): pt has a bone spur on his left ankle and would like to be seen. Pt saw dr back in 2018.   Would the patient rather a call back or a response via [facility]? call  Best Call Back Number: [phone] (home)     Additional Information: please advise and thank you.

## 2024-04-11 ENCOUNTER — OFFICE VISIT (OUTPATIENT)
Dept: ORTHOPEDICS | Facility: CLINIC | Age: 19
End: 2024-04-11
Payer: MEDICAID

## 2024-04-11 DIAGNOSIS — M79.662 PAIN IN LEFT LOWER LEG: ICD-10-CM

## 2024-04-11 DIAGNOSIS — M79.672 LEFT FOOT PAIN: Primary | ICD-10-CM

## 2024-04-11 DIAGNOSIS — M76.72 PERONEAL TENDINITIS OF LEFT LOWER EXTREMITY: ICD-10-CM

## 2024-04-11 PROCEDURE — 99204 OFFICE O/P NEW MOD 45 MIN: CPT | Mod: S$PBB,,,

## 2024-04-11 PROCEDURE — 99999 PR PBB SHADOW E&M-EST. PATIENT-LVL III: CPT | Mod: PBBFAC,,,

## 2024-04-11 PROCEDURE — 99213 OFFICE O/P EST LOW 20 MIN: CPT | Mod: PBBFAC,PN

## 2024-04-11 PROCEDURE — 1160F RVW MEDS BY RX/DR IN RCRD: CPT | Mod: CPTII,,,

## 2024-04-11 PROCEDURE — 3044F HG A1C LEVEL LT 7.0%: CPT | Mod: CPTII,,,

## 2024-04-11 PROCEDURE — 1159F MED LIST DOCD IN RCRD: CPT | Mod: CPTII,,,

## 2024-04-11 RX ORDER — NAPROXEN 500 MG/1
500 TABLET ORAL 2 TIMES DAILY WITH MEALS
Qty: 60 TABLET | Refills: 3 | Status: SHIPPED | OUTPATIENT
Start: 2024-04-11

## 2024-04-11 NOTE — PROGRESS NOTES
Subjective:      Patient ID: Nico Mcpherson is a 18 y.o. male.    Chief Complaint: Pain of the Left Knee      HPI: Nico Mcpherson is a 18 y.o. male who presents to clinic for intermittent left  foot  pain. Patient denies known ARANZA. The pain started 2-3 years ago and is becoming progressively worse.  Pain is located over (points to) plantar and posterior heel and and posterior lateral malleoli. He reports that the pain is a 3/10 pulling-type pain today. Pain is 8-9/10 at its worst.  The pain is aggravated by walking, running, standing for long periods of time, rising after sitting for a prolonged period of time, and walking barefoot.  He states he used to run 3 miles twice a week around 3 years ago, but stopped due to the increase in pain. Associated symptoms include occasional pain radiating to the lateral aspect of the foot into the pinky toe, occasional pain radiating up into the lateral aspect of his lower leg, feeling a pulling sensation in the lateral foot, and feeling a pulling sensation on the lateral aspect of his lower leg.  He states the pain begins in his heel, and he occasionally feels as though something is poking him in the heel.  Denies numbness, tingling, and inability to bear weight.. The pain is affecting ADLs and limiting desired level of activity. Ambulation reportedly has been impaired, and others have noticed he occasionally limps. There is not a history of previous surgery to the left leg.      Previous treatments include none.     Occupation: College student at [employer]    Ambulating: unassisted  Diabetic:  No  Smoking:  He has never smoked.  History of DVT/PE: Negative    PAST MEDICAL HISTORY:    Past Medical History:   Diagnosis Date    BMI (body mass index), pediatric, greater than or equal to 95% for age 8/12/2014     PAST SURGICAL HISTORY:    Past Surgical History:   Procedure Laterality Date    ADENOIDECTOMY      FUNCTIONAL ENDOSCOPIC SINUS SURGERY (FESS) USING COMPUTER-ASSISTED NAVIGATION  N/A 4/18/2023    Procedure: FESS, USING COMPUTER-ASSISTED NAVIGATION;  Surgeon: Rodger Chin MD;  Location: [facility] OR 1ST FLR;  Service: ENT;  Laterality: N/A;    NASAL SEPTOPLASTY N/A 4/18/2023    Procedure: SEPTOPLASTY, NOSE;  Surgeon: Rodger Chin MD;  Location: [facility] OR 1ST FLR;  Service: ENT;  Laterality: N/A;    NASAL TURBINATE REDUCTION Bilateral 4/18/2023    Procedure: REDUCTION, NASAL TURBINATE;  Surgeon: Rodger Chin MD;  Location: [facility] OR 1ST FLR;  Service: ENT;  Laterality: Bilateral;    REMOVAL OF NAIL OF DIGIT Left 9/20/2018    Procedure: REMOVAL, NAIL, DIGIT;  Surgeon: Blayne العراقي DPM;  Location: [facility] OR;  Service: Podiatry;  Laterality: Left;    toenail surgery Bilateral     removal x2    TONSILLECTOMY       FAMILY HISTORY:    Family History   Problem Relation Name Age of Onset    Hypertension Father      Hypertension Maternal Grandmother      Hyperlipidemia Maternal Grandmother      Hypertension Maternal Grandfather      Hyperlipidemia Maternal Grandfather      Hypertension Paternal Grandmother      Heart disease Neg Hx      Cancer Neg Hx       SOCIAL HISTORY:    Social History     Occupational History    Not on file   Tobacco Use    Smoking status: Never    Smokeless tobacco: Never   Substance and Sexual Activity    Alcohol use: No    Drug use: No    Sexual activity: Not on file      MEDICATIONS:   Current Outpatient Medications:     ammonium lactate (LAC-HYDRIN) 12 % lotion, Apply 1 application on the skin twice a day; to the neck, Disp: 400 mL, Rfl: 12    hydrOXYzine HCL (ATARAX) 25 MG tablet, Take 1 tablet (25 mg total) by mouth nightly as needed., Disp: 30 tablet, Rfl: 3    minoxidiL (LONITEN) 2.5 MG tablet, Take one-half tablet by mouth once daily, Disp: 45 tablet, Rfl: 0    naproxen (NAPROSYN) 500 MG tablet, Take 1 tablet (500 mg total) by mouth 2 (two) times daily with meals., Disp: 60 tablet, Rfl: 3    omega-3 fatty acids/fish oil (FISH OIL-OMEGA-3 FATTY ACIDS) 300-1,000 mg  capsule, Take by mouth once daily. Rarely takes, Disp: , Rfl:     ALLERGIES:   Review of patient's allergies indicates:   Allergen Reactions    Whey Swelling     Tongue swelling; denies trouble breathing     Review of Systems:  Constitution: Negative for chills, fever and night sweats.   HENT: Negative for congestion and headaches.    Eyes: Negative for blurred vision or vision loss.  Cardiovascular: Negative for chest pain and syncope.   Respiratory: Negative for cough and shortness of breath.    Endocrine: Negative for polydipsia, polyphagia and polyuria.   Hematologic/Lymphatic: Negative for bleeding problem. Does not bruise/bleed easily.   Skin: Negative for dry skin, itching and rash.   Musculoskeletal: See HPI.   Gastrointestinal: Negative for abdominal pain and bowel incontinence.   Genitourinary: Negative for bladder incontinence and nocturia.   Neurological: Negative for disturbances in coordination, loss of balance and seizures.   Psychiatric/Behavioral: Negative for depression. The patient does not have insomnia.    Allergic/Immunologic: Negative for hives and persistent infections.        Objective:      There were no vitals filed for this visit.    PHYSICAL EXAM:  General: Alert & oriented x3, well-developed and well-nourished, in no acute distress, sitting comfortably in the exam room.  Skin: Warm and dry. Capillary refill less than 2 seconds.   Head: Normocephalic and atraumatic.   Eyes: Sclera appear normal.   Nose: No deformities seen.   Ears: No deformities seen.   Neck: No tracheal deviation present.   Pulmonary/Chest: Breathing unlabored.   Neurological: Alert and oriented to person, place, and time.   Psychiatric: Mood is pleasant and affect appropriate.     LEFT FOOT/ANKLE:        Observation/Inspection:    No evidence of visible deformity, swelling, discoloration, scars, or atrophy.  Normal gait. No difficulty with heel/toe walking.  Hindfoot and forefoot alignment appears normal.          Palpation:   Tenderness to palpation to the retrocalcaneus, peroneal tendons, and dorsum of the foot.   Otherwise, negative tenderness to palpation to bony prominences and soft tissues throughout.        ROM (active and passive):  Ankle DF/PF:  15/45        Eversion/Inversion: 15/25     Midfoot ABD/ADD: 10/10     First MTP DF/PF: 60/25         Strength:   normal 5/5 strength in all tested muscle groups.         Special Tests:  Forced DF/ER: Negative for pain at syndesmosis.  Mid-leg squeeze:  Negative for pain at syndesmosis.  Forced DF:  Negative for anterior joint line pain.    Forced PF:  Negative for posterior ankle pain.   Forced INV:  Positive for lateral pain.   Forced EV:  Negative for medial pain.        Neurovascular Exam:  Digits warm and well perfused, brisk capillary refill <3 seconds throughout  NVI motor/LTS to median, radial, and ulnar nerves, radial pulse 2+    Imaging:   X-Rays: 3 views of left  tibia and fibula  dated 02/14/2024 , and independently reviewed, show: No obvious fracture or dislocation is noted. An os trigone or posterior talar spur is noted.         Assessment:       1. Left foot pain    2. Pain in left lower leg    3. Peroneal tendinitis of left lower extremity        Plan:       Orders Placed This Encounter    Ambulatory referral/consult to Physical/Occupational Therapy    naproxen (NAPROSYN) 500 MG tablet     I explained the nature of the problem to the patient.     I discussed at length with the patient all the different treatment options available for his left foot and ankle including anti-inflammatories, acetaminophen, rest, ice/heat, physical therapy, and corticosteroid injections. I explained the potential role of surgery in the treatment of this condition. The patient understands that if non-surgical measures do not adequately control symptoms, surgery will be considered in the future.     I personally discussed this case and reviewed imaging with supervising physician,   Nicola.     Dr. Petersen and I recommend:  Medications:  Prescription for Naproxen 500 mg BID provided today for PRN pain management. Patient understands to take with food and/or OTC prilosec to decrease GI side effects. Advised patient to refrain from taking other anti-inflammatory medications while taking this medication, however he may alternate with acetaminophen as needed.  Physical Therapy:  Ambulatory referral to physical therapy for eval and treat of left ankle and foot pain.  Pain Management: Ice compress to the affected area 2-3x a day for 15-20 minutes as needed for pain management.      Follow-Up: 3 months with Ida Abraham PA-C for virtual follow-up. Consider ordering an MRI of the left ankle if symptoms do not improve.      All of the patient's questions were answered and the patient will contact us if they have any questions or concerns in the interim.    Ida Abraham PA-C  [facility]  Orthopedic Surgery    Medical Dictation software was used during the dictation of portions or the entirety of this medical record.  Phonetic or grammatic errors may exist due to the use of this software. For clarification, refer to the author of the document.

## 2024-04-18 ENCOUNTER — CLINICAL SUPPORT (OUTPATIENT)
Dept: REHABILITATION | Facility: HOSPITAL | Age: 19
End: 2024-04-18
Payer: MEDICAID

## 2024-04-18 DIAGNOSIS — M79.662 PAIN IN LEFT LOWER LEG: ICD-10-CM

## 2024-04-18 DIAGNOSIS — M76.72 PERONEAL TENDINITIS OF LEFT LOWER EXTREMITY: ICD-10-CM

## 2024-04-18 DIAGNOSIS — M25.672 DECREASED RANGE OF MOTION OF LEFT ANKLE: Primary | ICD-10-CM

## 2024-04-18 DIAGNOSIS — M79.672 LEFT FOOT PAIN: ICD-10-CM

## 2024-04-18 PROCEDURE — 97112 NEUROMUSCULAR REEDUCATION: CPT | Mod: PO | Performed by: PHYSICAL THERAPIST

## 2024-04-18 PROCEDURE — 97162 PT EVAL MOD COMPLEX 30 MIN: CPT | Mod: PO | Performed by: PHYSICAL THERAPIST

## 2024-04-20 PROBLEM — M25.672 DECREASED RANGE OF MOTION OF LEFT ANKLE: Status: ACTIVE | Noted: 2024-04-20

## 2024-04-20 NOTE — PLAN OF CARE
[facility]   Physical Therapy Initial Evaluation      Name: Nico Mcpherson  [facility] Number: [id number]    Therapy Diagnosis:   Encounter Diagnoses   Name Primary?    Pain in left lower leg     Left foot pain     Peroneal tendinitis of left lower extremity     Decreased range of motion of left ankle Yes        Physician: Ida Abraham P*    Physician Orders: EVAL AND TREAT: Post Surgical?No Eval and TreatYes Type of TherapyOutpatient Therapy Location:[facility]   Medical Diagnosis from Referral:   Diagnosis   M79.662 (ICD-10-CM) - Pain in left lower leg   M79.672 (ICD-10-CM) - Left foot pain   M76.72 (ICD-10-CM) - Peroneal tendinitis of left lower extremity     Evaluation Date: 4/18/2024  Authorization Period Expiration: 04/11/2025  Plan of Care Expiration: 6/14/2024  Progress Note Due: 4/18/2024  Date of Surgery: na  Visit # / Visits authorized: 1/ 1   FOTO: 1/ 3 (4/18/2024)    Precautions: Standard     Time In: 1400  Time Out: 1445  Total Billable Time: 45 minutes    Subjective     Date of onset: chronic    History of current condition - Nico reports: The pain started 2-3 years ago and is becoming progressively worse.  Pain is located over (points to) plantar and posterior heel and and posterior lateral malleoli. He reports that the pain is a 3/10 pulling-type pain today. Pain is 8-9/10 at its worst.  The pain is aggravated by walking, running, standing for long periods of time, rising after sitting for a prolonged period of time, and walking barefoot. He would like to be able to walk recreationally. He states he used to run 3 miles twice a week around 3 years ago, but stopped due to the increase in pain. Associated symptoms include occasional pain radiating to the lateral aspect of the foot into the pinky toe, occasional pain radiating up into the lateral aspect of his lower leg, feeling a pulling sensation in the lateral foot, and feeling a pulling sensation on the lateral aspect of  his lower leg.  He states the pain begins in his heel, and he occasionally feels as though something is poking him in the heel.  Denies numbness, tingling, and inability to bear weight.. The pain is affecting ADLs and limiting desired level of activity. Ambulation reportedly has been impaired, and others have noticed he occasionally limps. There is not a history of previous surgery to the left leg.    College student at [employer]    Falls: 0    Imaging: none    Prior Therapy: none  Social History:  lives with their family  Occupation: school [employer]  Prior Level of Function: chronic Left calf pain  Current Level of Function: inc pain with activities, pain with jog and walking    Pain:  Current 2/10, worst 9/10, best 2/10   Location: left calf    Description: Sharp  Aggravating Factors: Walking  Easing Factors: cushion    Patients goals: to improve pain and activity     Medical History:   Past Medical History:   Diagnosis Date    BMI (body mass index), pediatric, 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    Medications:   Nico has a current medication list which includes the following prescription(s): ammonium lactate, hydroxyzine hcl, minoxidil, naproxen, and fish oil-omega-3 fatty acids.    Allergies:   Review of patient's allergies indicates:   Allergen Reactions    Whey Swelling     Tongue swelling; denies trouble breathing      Objective      Observation: neutral arches in standing, no significant leg deformities.      Posture: abnormal      Gait: normal    Range of Motion: AROM (PROM):  Hip Left Right   Flexion wnl degrees wnl degrees   Abduction wnl  "degrees wnl degrees   Ext Rotation wnl degrees wnl degrees   Int Rotation wnl degrees wnl degrees     Knee Left Right   Extension wnl degrees wnl degrees   Flexion wnl degrees wnl degrees     Ankle Left Right   Dorsiflexion 0 degrees +5 degrees   Plantarflexion 60 degrees 60 degrees   Inversion 30 degrees 20/30 (A/P) degrees   Eversion 25 degrees 25 degrees     Strength:  Hip Left Right   Flexion 5/5 5/5   Abduction 5/5 5/5   Adduction 5/5 5/5   Extension 5/5 5/5   External Rot 5/5 5/5   Internal Rot 5/5 5/5     Knee Left Right   Extension 5/5 5/5   Flexion 5/5 5/5     Ankle Left Right   Dorsiflexion 5/5 5/5   Plantarflexion 5/5 5/5   Inversion 4+/5 5/5   Eversion 5/5 5/5     Joint Mobility: nt    Palpation: pos Left med and lat gastroc junction    Sensation: intact bilat    Flexibility: tight gastroc     Intake Outcome Measure for FOTO LOWER LEG WITHOUT KNEE Survey    Therapist reviewed FOTO scores for Nico Mcpherson on 4/18/2024.   FOTO report - see Media section or FOTO account episode details.    Intake Score: 62  Goal: 77       Treatment     Total Treatment time (time-based codes) separate from Evaluation: 10 minutes     Nico received the treatments listed below:      neuromuscular re-education activities to improve: Balance, Sense, Proprioception, Posture, and education for 10 minutes. The following activities were included:  Calf stretch 2 ways 3 x 30"  IV stretch right 3 x 30"  Standing heel raises DOUBLE LEG 2 x 10    Patient Education and Home Exercises     Education provided:   - HEP  - Pt/family was provided educational information, including: role of PT, role of pt/caregiver, goals for PT, POC, scheduling, and attendance policy.- pt verbalized understanding.     Written Home Exercises Provided: yes. Exercises were reviewed and Nico was able to demonstrate them prior to the end of the session.  Nico demonstrated good  understanding of the education provided. See EMR under Patient Instructions for " exercises provided during therapy sessions.    Assessment     Nico is a 18 y.o. male referred to outpatient Physical Therapy with a medical diagnosis of Peroneal tendinitis of left lower extremity. Patient presents with peroneal tendonitis and tight gastroc as evidenced by presence of pain to palpation, limited ROM, and activities in wt bearing. Pt will benefit from home program and supportive PT to address the problem.    Patient prognosis is Good.   Patient will benefit from skilled outpatient Physical Therapy to address the deficits stated above and in the chart below, provide patient /family education, and to maximize patientt's level of independence.     Plan of care discussed with patient: Yes  Patient's spiritual, cultural and educational needs considered and patient is agreeable to the plan of care and goals as stated below:     Anticipated Barriers for therapy: wt, lifestyle    Medical Necessity is demonstrated by the following  History  Co-morbidities and personal factors that may impact the plan of care [] LOW: no personal factors / co-morbidities  [x] MODERATE: 1-2 personal factors / co-morbidities  [] HIGH: 3+ personal factors / co-morbidities    Moderate / High Support Documentation:   Co-morbidities affecting plan of care: BMI    Personal Factors:   age  lifestyle     Examination  Body Structures and Functions, activity limitations and participation restrictions that may impact the plan of care [] LOW: addressing 1-2 elements  [x] MODERATE: 3+ elements  [] HIGH: 4+ elements (please support below)    Moderate / High Support Documentation: Patient presents with peroneal tendonitis and tight gastroc as evidenced by presence of pain to palpation, limited ROM, and activities in wt bearing.      Clinical Presentation [] LOW: stable  [x] MODERATE: Evolving  [] HIGH: Unstable     Decision Making/ Complexity Score: moderate       Goals:  Short Term Goals: 4 weeks   -Improve Left ankle AROM DF to +5 deg for  improving gastroc length.  -Improve right ankle IV to 25 deg for symmetry and to reduce risk of injury to Left foot/ankle.    Long Term Goals: 8 weeks   -Improve FOTO score to 77 for improved QOL.    Plan     Plan of care Certification: 4/18/2024 to 6/14/2024.    Outpatient Physical Therapy 1-2 times weekly for 6-8 weeks to include the following interventions: Electrical Stimulation IFC, Gait Training, Manual Therapy, Moist Heat/ Ice, Neuromuscular Re-ed, Patient Education, Therapeutic Activities, Therapeutic Exercise, Ultrasound, and dry needling.     Ramandeep Lynn, PT        Physician's Signature: _________________________________________ Date: ________________

## 2024-04-23 ENCOUNTER — CLINICAL SUPPORT (OUTPATIENT)
Dept: REHABILITATION | Facility: HOSPITAL | Age: 19
End: 2024-04-23
Payer: MEDICAID

## 2024-04-23 DIAGNOSIS — M25.672 DECREASED RANGE OF MOTION OF LEFT ANKLE: Primary | ICD-10-CM

## 2024-04-23 PROCEDURE — 97110 THERAPEUTIC EXERCISES: CPT | Mod: PO | Performed by: PHYSICAL THERAPIST

## 2024-04-23 PROCEDURE — 97112 NEUROMUSCULAR REEDUCATION: CPT | Mod: PO | Performed by: PHYSICAL THERAPIST

## 2024-04-23 NOTE — PROGRESS NOTES
"[facility]   Physical Therapy Treatment Note      Name: Nico Mcpherson  [facility] Number: [id number]    Therapy Diagnosis:   Encounter Diagnosis   Name Primary?    Decreased range of motion of left ankle Yes     Physician: Ida Abraham P*    Visit Date: 2024    Physician Orders: EVAL AND TREAT: Post Surgical?No Eval and TreatYes Type of TherapyOutpatient Therapy Location:[facility]   Medical Diagnosis from Referral:   Diagnosis   M79.662 (ICD-10-CM) - Pain in left lower leg   M79.672 (ICD-10-CM) - Left foot pain   M76.72 (ICD-10-CM) - Peroneal tendinitis of left lower extremity      Evaluation Date: 2024  Authorization Period Expiration: 2025  Plan of Care Expiration: 2024  Progress Note Due: 2024  Date of Surgery: na  Visit # / Visits authorized: ,  pending   FOTO: 1/ 3 (2024)     Precautions: Standard      Time In: 1400  Time Out: 1437  Total Billable Time: 30 minutes       PTA Visit #: 0/5       Subjective     Patient reports: slightly better condition with doing home program.  He was compliant with home exercise program.  Response to previous treatment: no adverse effect  Functional change: tbd    Pain: 0/10 pain this morning at waking  Location: bilateral feet      Objective      Objective Measures updated at progress report unless specified.     Treatment     Nico received the treatments listed below:      therapeutic exercises to develop strength, endurance, ROM, and flexibility for 10 minutes including:  Recumbent bike 8 mins  Calf stretch 2 ways 3 x 30"    manual therapy techniques: 0 were applied to the: 0 for 0 minutes, includin    neuromuscular re-education activities to improve: Balance, Sense, Proprioception, and Posture for 25 minutes. The following activities were included:  Seated:  x30  Toe curls on towel  IV/EV with towel  Arch doming  Heel raises  Toe yoga x 20    Education: additional home program, plan, NM " effects.    therapeutic activities to improve functional performance for 0  minutes, includin    gait training to improve functional mobility and safety for 0  minutes, includin    Patient Education and Home Exercises       Education provided:   - Cont HEP + new    Written Home Exercises Provided: yes. Exercises were reviewed and Nico was able to demonstrate them prior to the end of the session.  Nico demonstrated good  understanding of the education provided. See Electronic Medical Record under Patient Instructions for exercises provided during therapy sessions    Assessment     Nico did not have pain today. He had difficulty with right arch doming and some cues of isolated ankle IV/EV, but was easily able to achieve. Nico will continue to benefit from flexibility and stability.    Nico Is progressing well towards his goals.   Patient prognosis is Good.     Patient will continue to benefit from skilled outpatient physical therapy to address the deficits listed in the problem list box on initial evaluation, provide pt/family education and to maximize pt's level of independence in the home and community environment.     Patient's spiritual, cultural and educational needs considered and pt agreeable to plan of care and goals.     Anticipated barriers to physical therapy: wt, lifestyle     Goals:  (ongoing)  Short Term Goals: 4 weeks   -Improve Left ankle AROM DF to +5 deg for improving gastroc length.  -Improve right ankle IV to 25 deg for symmetry and to reduce risk of injury to Left foot/ankle.     Long Term Goals: 8 weeks   -Improve FOTO score to 77 for improved QOL.    Plan     Plan of care Certification: 2024 to 2024.     Outpatient Physical Therapy 1-2 times weekly for 6-8 weeks to include the following interventions: Electrical Stimulation IFC, Gait Training, Manual Therapy, Moist Heat/ Ice, Neuromuscular Re-ed, Patient Education, Therapeutic Activities, Therapeutic Exercise,  Ultrasound, and dry needling.     Ramandeep Lynn, PT

## 2024-04-24 DIAGNOSIS — F41.9 ANXIOUSNESS: ICD-10-CM

## 2024-04-25 RX ORDER — HYDROXYZINE HYDROCHLORIDE 25 MG/1
25 TABLET, FILM COATED ORAL NIGHTLY PRN
Qty: 30 TABLET | Refills: 3 | Status: SHIPPED | OUTPATIENT
Start: 2024-04-25

## 2024-04-30 ENCOUNTER — CLINICAL SUPPORT (OUTPATIENT)
Dept: REHABILITATION | Facility: HOSPITAL | Age: 19
End: 2024-04-30
Payer: MEDICAID

## 2024-04-30 DIAGNOSIS — M25.672 DECREASED RANGE OF MOTION OF LEFT ANKLE: Primary | ICD-10-CM

## 2024-04-30 PROCEDURE — 97112 NEUROMUSCULAR REEDUCATION: CPT | Mod: PO,CQ

## 2024-04-30 PROCEDURE — 97110 THERAPEUTIC EXERCISES: CPT | Mod: PO,CQ

## 2024-04-30 PROCEDURE — 97140 MANUAL THERAPY 1/> REGIONS: CPT | Mod: PO,CQ

## 2024-04-30 NOTE — PROGRESS NOTES
"[facility]   Physical Therapy Treatment Note      Name: Nico Mcpherson  [facility] Number: [id number]    Therapy Diagnosis:   Encounter Diagnosis   Name Primary?    Decreased range of motion of left ankle Yes     Physician: Ida Abraham P*    Visit Date: 4/30/2024    Physician Orders: EVAL AND TREAT: Post Surgical?No Eval and TreatYes Type of TherapyOutpatient Therapy Location:[facility]   Medical Diagnosis from Referral:   Diagnosis   M79.662 (ICD-10-CM) - Pain in left lower leg   M79.672 (ICD-10-CM) - Left foot pain   M76.72 (ICD-10-CM) - Peroneal tendinitis of left lower extremity      Evaluation Date: 4/18/2024  Authorization Period Expiration: 04/11/2025  Plan of Care Expiration: 6/14/2024  Progress Note Due: 4/18/2024  Date of Surgery: na  Visit # / Visits authorized: 1/ 1, 3/8 pending   FOTO: 1/ 3 (4/18/2024)     Precautions: Standard      Time In:2:58 PM  Time Out: 3:55  Total Billable Time: 57 minutes       PTA Visit #: 0/5       Subjective     Patient reports: that his ankle is feeling better, no pn today, no adverse effects from last tx. Performing HEP w/o difficulty.  He was compliant with home exercise program.  Response to previous treatment: no adverse effect  Functional change: tbd    Pain: 0/10 pain this morning at waking  Location: bilateral feet      Objective      Objective Measures updated at progress report unless specified.     Treatment     Nico received the treatments listed below:      therapeutic exercises to develop strength, endurance, ROM, and flexibility for 15 minutes including:  Recumbent bike 8 mins NP  Nu-Step 10 min  Calf stretch 2 ways 3 x 30"  EV,IV,DF, PF with red t-band 30x each    manual therapy techniques: were applied to the: L ankle for 10 minutes, including:  Distraction, grade II jt mobs, AP      neuromuscular re-education activities to improve: Balance, Sense, Proprioception, and Posture for 30 minutes. The following activities were " included:  Seated:  x30  Toe curls on towel  IV/EV with towel  Arch doming  Heel raises  Toe yoga x 20  SLS 3 x 30 sec  Tandem stance 3 x 30 sec    Education: additional home program, plan, NM effects.    therapeutic activities to improve functional performance for 0  minutes, includin    gait training to improve functional mobility and safety for 0  minutes, includin    Patient Education and Home Exercises       Education provided:   - Cont HEP + new    Written Home Exercises Provided: yes. Exercises were reviewed and Nico was able to demonstrate them prior to the end of the session.  Nico demonstrated good  understanding of the education provided. See Electronic Medical Record under Patient Instructions for exercises provided during therapy sessions    Assessment     Nico serafin today's tx with progression of ther ex, neuromuscular re-ed and initiation of manual intervention well. He did not have pain today. He was able to progress with with exs adding bal and strengthening activities w/o difficulty or complaint. Nico will continue to benefit from flexibility and stability.    Nico Is progressing well towards his goals.   Patient prognosis is Good.     Patient will continue to benefit from skilled outpatient physical therapy to address the deficits listed in the problem list box on initial evaluation, provide pt/family education and to maximize pt's level of independence in the home and community environment.     Patient's spiritual, cultural and educational needs considered and pt agreeable to plan of care and goals.     Anticipated barriers to physical therapy: wt, lifestyle     Goals:  (ongoing)  Short Term Goals: 4 weeks   -Improve Left ankle AROM DF to +5 deg for improving gastroc length.  -Improve right ankle IV to 25 deg for symmetry and to reduce risk of injury to Left foot/ankle.     Long Term Goals: 8 weeks   -Improve FOTO score to 77 for improved QOL.    Plan     Plan of care Certification:  4/18/2024 to 6/14/2024.     Outpatient Physical Therapy 1-2 times weekly for 6-8 weeks to include the following interventions: Electrical Stimulation IFC, Gait Training, Manual Therapy, Moist Heat/ Ice, Neuromuscular Re-ed, Patient Education, Therapeutic Activities, Therapeutic Exercise, Ultrasound, and dry needling.     James Vences, PTA

## 2024-05-07 ENCOUNTER — CLINICAL SUPPORT (OUTPATIENT)
Dept: REHABILITATION | Facility: HOSPITAL | Age: 19
End: 2024-05-07
Payer: MEDICAID

## 2024-05-07 DIAGNOSIS — M25.672 DECREASED RANGE OF MOTION OF LEFT ANKLE: Primary | ICD-10-CM

## 2024-05-07 PROCEDURE — 97112 NEUROMUSCULAR REEDUCATION: CPT | Mod: PO | Performed by: PHYSICAL THERAPIST

## 2024-05-07 PROCEDURE — 97110 THERAPEUTIC EXERCISES: CPT | Mod: PO | Performed by: PHYSICAL THERAPIST

## 2024-05-07 NOTE — PROGRESS NOTES
"[facility]   Physical Therapy Treatment Note      Name: Nico Mcpherson  [facility] Number: [id number]    Therapy Diagnosis:   Encounter Diagnosis   Name Primary?    Decreased range of motion of left ankle Yes     Physician: Ida Abraham P*    Visit Date: 5/7/2024    Physician Orders: EVAL AND TREAT: Post Surgical?No Eval and TreatYes Type of TherapyOutpatient Therapy Location:Ank[facility]   Medical Diagnosis from Referral:   Diagnosis   M79.662 (ICD-10-CM) - Pain in left lower leg   M79.672 (ICD-10-CM) - Left foot pain   M76.72 (ICD-10-CM) - Peroneal tendinitis of left lower extremity      Evaluation Date: 4/18/2024  Authorization Period Expiration: 04/11/2025  Plan of Care Expiration: 6/14/2024  Progress Note Due: 4/18/2024  Date of Surgery: na  Visit # / Visits authorized: 1/ 1, 3/8  FOTO: 1/ 3 (4/18/2024)     Precautions: Standard      Time In: 1000  Time Out: 1100  Total Billable Time: 55 minutes       PTA Visit #: 0/5       Subjective     Patient reports: that his ankle is feeling better, no pn today, no adverse effects from last tx. Performing HEP w/o difficulty. He is walking 2-3 miles at 25 mins and feeling better after walking with stretching.  He was compliant with home exercise program.  Response to previous treatment: no adverse effect  Functional change: progressing SINGLE LEG balance and heel raise.    Pain: 0/10 pain this morning at waking  Location: bilateral feet      Objective      Objective Measures updated at progress report unless specified.     Treatment     Nico received the treatments listed below:      therapeutic exercises to develop strength, endurance, ROM, and flexibility for 15 minutes including:  Recumbent bike 8 mins  Nu-Step 10 minimum-NP  Calf stretch 2 ways 3 x 30"  EV,IV,DF, PF with red t-band 30x each    manual therapy techniques: were applied to the: L ankle for 00 minutes, including:  Distraction, grade II jt mobs, AP      neuromuscular re-education " activities to improve: Balance, Sense, Proprioception, and Posture for 40 minutes. The following activities were included:  Seated:  x30  Toe curls on towel  IV/EV with towel  Arch doming  +arch walking lateral 5 laps at table  Standing Heel raises  Standing SINGLE LEG heel raise x 10 each  +Lat toe walking 5 laps at table  Toe yoga x 20  SLS 3 x 30 sec on foam  Tandem stance 3 x 30 sec on foam  Rocker board 4 way x 20 each    Education: additional home program, plan, NM effects.    therapeutic activities to improve functional performance for 0  minutes, includin    gait training to improve functional mobility and safety for 0  minutes, includin    Patient Education and Home Exercises       Education provided:   - Cont HEP + new    Written Home Exercises Provided: yes. Exercises were reviewed and Nico was able to demonstrate them prior to the end of the session.  Nico demonstrated good  understanding of the education provided. See Electronic Medical Record under Patient Instructions for exercises provided during therapy sessions    Assessment     Nico serafin today's tx with progression of ther ex, neuromuscular re-ed. He did not have pain today. He was able to progress with with exs adding bal and strengthening activities w/o difficulty or complaint. + foam. + SINGLE LEG activities. Nico is walking recreationally better without as much resulting soreness. Nico will continue to benefit from flexibility and stability.    Nioc Is progressing well towards his goals.   Patient prognosis is Good.     Patient will continue to benefit from skilled outpatient physical therapy to address the deficits listed in the problem list box on initial evaluation, provide pt/family education and to maximize pt's level of independence in the home and community environment.     Patient's spiritual, cultural and educational needs considered and pt agreeable to plan of care and goals.     Anticipated barriers to physical  therapy: wt, lifestyle     Goals:  (ongoing)  Short Term Goals: 4 weeks   -Improve Left ankle AROM DF to +5 deg for improving gastroc length.  -Improve right ankle IV to 25 deg for symmetry and to reduce risk of injury to Left foot/ankle.     Long Term Goals: 8 weeks   -Improve FOTO score to 77 for improved QOL.    Plan     Plan of care Certification: 4/18/2024 to 6/14/2024.     Outpatient Physical Therapy 1-2 times weekly for 6-8 weeks to include the following interventions: Electrical Stimulation IFC, Gait Training, Manual Therapy, Moist Heat/ Ice, Neuromuscular Re-ed, Patient Education, Therapeutic Activities, Therapeutic Exercise, Ultrasound, and dry needling.     Ramandeep Lynn, PT

## 2024-07-17 ENCOUNTER — OFFICE VISIT (OUTPATIENT)
Dept: ORTHOPEDICS | Facility: CLINIC | Age: 19
End: 2024-07-17
Payer: MEDICAID

## 2024-07-17 DIAGNOSIS — M79.672 LEFT FOOT PAIN: Primary | ICD-10-CM

## 2024-07-17 DIAGNOSIS — M76.72 PERONEAL TENDINITIS OF LEFT LOWER EXTREMITY: ICD-10-CM

## 2024-07-17 PROCEDURE — 1159F MED LIST DOCD IN RCRD: CPT | Mod: CPTII,95,,

## 2024-07-17 PROCEDURE — 99213 OFFICE O/P EST LOW 20 MIN: CPT | Mod: 95,,,

## 2024-07-17 PROCEDURE — 1160F RVW MEDS BY RX/DR IN RCRD: CPT | Mod: CPTII,95,,

## 2024-07-17 PROCEDURE — 3044F HG A1C LEVEL LT 7.0%: CPT | Mod: CPTII,95,,

## 2024-07-17 NOTE — PROGRESS NOTES
Audio Only Telehealth Visit     The patient location is:  Home  The chief complaint leading to consultation is: left foot pain  Visit type: Virtual visit with audio only (telephone)  Total time spent with patient: 5 minutes      The reason for the audio only service rather than synchronous audio and video virtual visit was related to technical difficulties or patient preference/necessity.     Each patient to whom I provide medical services by telemedicine is:  (1) informed of the relationship between the physician and patient and the respective role of any other health care provider with respect to management of the patient; and (2) notified that they may decline to receive medical services by telemedicine and may withdraw from such care at any time. Patient verbally consented to receive this service via voice-only telephone call.       HPI: Nico Mcpherson is a 18 y.o. male who is following up for ongoing pain with his left foot. Patient was last seen by myself on 04/11/2024 for this issue.  Last visit, physical therapy was ordered and Naproxen was prescribed.  Patient states he completed physical therapy, and they provided him with a HEP.  Patient reports really good improvement in previously noted symptoms.  He states he continues to perform HEP.  Patient states he has not needed the Naproxen recently.  Patient is very pleased with his progress thus far.      Assessment and Plan:       1. Left foot pain        2. Peroneal tendinitis of left lower extremity          The patient reports very good improvement in previously noted symptoms.  Patient states he would like to continue to treat this conservatively at this time.    Advised patient to continue HEP and Naproxen as needed for pain management.      I advised patient to contact the office for follow up should symptoms return/worsened.      All of the patient's questions were answered and the patient will contact us if they have any questions or concerns in the  interim.    Ida Abraham PA-C  [facility]  Orthopedic Surgery    Medical Dictation software was used during the dictation of portions or the entirety of this medical record.  Phonetic or grammatic errors may exist due to the use of this software. For clarification, refer to the author of the document.     This service was not originating from a related E/M service provided within the previous 7 days nor will  to an E/M service or procedure within the next 24 hours or my soonest available appointment.  Prevailing standard of care was able to be met in this audio-only visit.

## 2024-08-29 DIAGNOSIS — F41.9 ANXIOUSNESS: ICD-10-CM

## 2024-08-30 RX ORDER — HYDROXYZINE HYDROCHLORIDE 25 MG/1
25 TABLET, FILM COATED ORAL NIGHTLY PRN
Qty: 30 TABLET | Refills: 3 | Status: SHIPPED | OUTPATIENT
Start: 2024-08-30

## (undated) DEVICE — MARKER SKIN STND TIP BLUE BARR

## (undated) DEVICE — SYR 10CC LUER LOCK

## (undated) DEVICE — NDL SPINAL SPINOCAN 22GX3.5

## (undated) DEVICE — SEE MEDLINE ITEM 152622

## (undated) DEVICE — SPONGE GAUZE 16PLY 4X4

## (undated) DEVICE — APPLICATOR ARISTA FLEX XL

## (undated) DEVICE — SEE MEDLINE ITEM 152487

## (undated) DEVICE — TRACKER PATIENT NON INVASIVE

## (undated) DEVICE — TRACKER ENT INSTRUMENT

## (undated) DEVICE — TOWEL OR DISP STRL BLUE 4/PK

## (undated) DEVICE — SEE MEDLINE ITEM 157128

## (undated) DEVICE — DRAPE EENT SPLIT STERILE

## (undated) DEVICE — HANDPIECE REFLUX ULTRA 45

## (undated) DEVICE — SWABSTICK PVP IODINE 3 S

## (undated) DEVICE — GAUZE SPONGE 4X4 12PLY

## (undated) DEVICE — TIP YANKAUERS BULB NO VENT

## (undated) DEVICE — CATH IV INTROCAN 22G X 1

## (undated) DEVICE — DRAPE STERI INSTRUMENT 1018

## (undated) DEVICE — SYR LUER LOCK 20CC W/O NDL

## (undated) DEVICE — NDL HYPO REG 25G X 1 1/2

## (undated) DEVICE — SYR DISP LL 5CC

## (undated) DEVICE — DRAPE CORETEMP FLD WRM 56X62IN

## (undated) DEVICE — SEE L#120831

## (undated) DEVICE — BLADE TRICUT

## (undated) DEVICE — POWDER ARISTA AH 1GM

## (undated) DEVICE — CONTAINER SPECIMEN STRL 4OZ

## (undated) DEVICE — SYR 50CC LL

## (undated) DEVICE — PENCIL ROCKER SWITCH 10FT CORD

## (undated) DEVICE — CUP MEDICINE STERILE 2OZ

## (undated) DEVICE — LABEL FOR UTILITY MARKER

## (undated) DEVICE — KIT SINUS OMC

## (undated) DEVICE — SPONGE DERMACEA 4X4IN 12PLY

## (undated) DEVICE — CUP MEDICINE GRADUATED 1OZ

## (undated) DEVICE — SPONGE DERMACEA GAUZE 4X4

## (undated) DEVICE — BOWL STERILE LARGE 32OZ

## (undated) DEVICE — TUBING SUC UNIV W/CONN 12FT

## (undated) DEVICE — SPONGE PATTY SURGICAL .5X3IN

## (undated) DEVICE — PACKING NASAL 4CM X 4CM ST